# Patient Record
Sex: FEMALE | Race: WHITE | NOT HISPANIC OR LATINO | URBAN - METROPOLITAN AREA
[De-identification: names, ages, dates, MRNs, and addresses within clinical notes are randomized per-mention and may not be internally consistent; named-entity substitution may affect disease eponyms.]

---

## 2023-03-22 ENCOUNTER — EMERGENCY (EMERGENCY)
Facility: HOSPITAL | Age: 38
LOS: 1 days | Discharge: HOME | End: 2023-03-22
Admitting: EMERGENCY MEDICINE
Payer: COMMERCIAL

## 2023-03-22 VITALS
SYSTOLIC BLOOD PRESSURE: 140 MMHG | RESPIRATION RATE: 16 BRPM | TEMPERATURE: 99 F | HEART RATE: 104 BPM | DIASTOLIC BLOOD PRESSURE: 92 MMHG | OXYGEN SATURATION: 100 %

## 2023-03-22 VITALS
RESPIRATION RATE: 16 BRPM | TEMPERATURE: 98 F | DIASTOLIC BLOOD PRESSURE: 90 MMHG | HEART RATE: 100 BPM | SYSTOLIC BLOOD PRESSURE: 132 MMHG | OXYGEN SATURATION: 100 %

## 2023-03-22 LAB
ALBUMIN SERPL ELPH-MCNC: 4.6 G/DL — SIGNIFICANT CHANGE UP (ref 3.3–5)
ALP SERPL-CCNC: 45 U/L — SIGNIFICANT CHANGE UP (ref 40–120)
ALT FLD-CCNC: 19 U/L — SIGNIFICANT CHANGE UP (ref 4–33)
AMPHET UR-MCNC: POSITIVE
ANION GAP SERPL CALC-SCNC: 13 MMOL/L — SIGNIFICANT CHANGE UP (ref 7–14)
APAP SERPL-MCNC: <10 UG/ML — LOW (ref 15–25)
APPEARANCE UR: CLEAR — SIGNIFICANT CHANGE UP
AST SERPL-CCNC: 24 U/L — SIGNIFICANT CHANGE UP (ref 4–32)
BARBITURATES UR SCN-MCNC: NEGATIVE — SIGNIFICANT CHANGE UP
BASOPHILS # BLD AUTO: 0.03 K/UL — SIGNIFICANT CHANGE UP (ref 0–0.2)
BASOPHILS NFR BLD AUTO: 0.3 % — SIGNIFICANT CHANGE UP (ref 0–2)
BENZODIAZ UR-MCNC: NEGATIVE — SIGNIFICANT CHANGE UP
BILIRUB SERPL-MCNC: 0.4 MG/DL — SIGNIFICANT CHANGE UP (ref 0.2–1.2)
BILIRUB UR-MCNC: NEGATIVE — SIGNIFICANT CHANGE UP
BUN SERPL-MCNC: 6 MG/DL — LOW (ref 7–23)
CALCIUM SERPL-MCNC: 9.3 MG/DL — SIGNIFICANT CHANGE UP (ref 8.4–10.5)
CHLORIDE SERPL-SCNC: 103 MMOL/L — SIGNIFICANT CHANGE UP (ref 98–107)
CO2 SERPL-SCNC: 23 MMOL/L — SIGNIFICANT CHANGE UP (ref 22–31)
COCAINE METAB.OTHER UR-MCNC: NEGATIVE — SIGNIFICANT CHANGE UP
COLOR SPEC: SIGNIFICANT CHANGE UP
CREAT SERPL-MCNC: 0.7 MG/DL — SIGNIFICANT CHANGE UP (ref 0.5–1.3)
CREATININE URINE RESULT, DAU: 74 MG/DL — SIGNIFICANT CHANGE UP
DIFF PNL FLD: NEGATIVE — SIGNIFICANT CHANGE UP
EGFR: 113 ML/MIN/1.73M2 — SIGNIFICANT CHANGE UP
EOSINOPHIL # BLD AUTO: 0 K/UL — SIGNIFICANT CHANGE UP (ref 0–0.5)
EOSINOPHIL NFR BLD AUTO: 0 % — SIGNIFICANT CHANGE UP (ref 0–6)
ETHANOL SERPL-MCNC: <10 MG/DL — SIGNIFICANT CHANGE UP
FLUAV AG NPH QL: SIGNIFICANT CHANGE UP
FLUBV AG NPH QL: SIGNIFICANT CHANGE UP
GLUCOSE SERPL-MCNC: 98 MG/DL — SIGNIFICANT CHANGE UP (ref 70–99)
GLUCOSE UR QL: NEGATIVE — SIGNIFICANT CHANGE UP
HCT VFR BLD CALC: 37.1 % — SIGNIFICANT CHANGE UP (ref 34.5–45)
HGB BLD-MCNC: 11.8 G/DL — SIGNIFICANT CHANGE UP (ref 11.5–15.5)
IANC: 8.09 K/UL — HIGH (ref 1.8–7.4)
IMM GRANULOCYTES NFR BLD AUTO: 0.3 % — SIGNIFICANT CHANGE UP (ref 0–0.9)
KETONES UR-MCNC: ABNORMAL
LEUKOCYTE ESTERASE UR-ACNC: NEGATIVE — SIGNIFICANT CHANGE UP
LYMPHOCYTES # BLD AUTO: 2.44 K/UL — SIGNIFICANT CHANGE UP (ref 1–3.3)
LYMPHOCYTES # BLD AUTO: 22 % — SIGNIFICANT CHANGE UP (ref 13–44)
MCHC RBC-ENTMCNC: 28 PG — SIGNIFICANT CHANGE UP (ref 27–34)
MCHC RBC-ENTMCNC: 31.8 GM/DL — LOW (ref 32–36)
MCV RBC AUTO: 88.1 FL — SIGNIFICANT CHANGE UP (ref 80–100)
METHADONE UR-MCNC: NEGATIVE — SIGNIFICANT CHANGE UP
MONOCYTES # BLD AUTO: 0.51 K/UL — SIGNIFICANT CHANGE UP (ref 0–0.9)
MONOCYTES NFR BLD AUTO: 4.6 % — SIGNIFICANT CHANGE UP (ref 2–14)
NEUTROPHILS # BLD AUTO: 8.09 K/UL — HIGH (ref 1.8–7.4)
NEUTROPHILS NFR BLD AUTO: 72.8 % — SIGNIFICANT CHANGE UP (ref 43–77)
NITRITE UR-MCNC: NEGATIVE — SIGNIFICANT CHANGE UP
NRBC # BLD: 0 /100 WBCS — SIGNIFICANT CHANGE UP (ref 0–0)
NRBC # FLD: 0 K/UL — SIGNIFICANT CHANGE UP (ref 0–0)
OPIATES UR-MCNC: NEGATIVE — SIGNIFICANT CHANGE UP
OXYCODONE UR-MCNC: NEGATIVE — SIGNIFICANT CHANGE UP
PCP SPEC-MCNC: SIGNIFICANT CHANGE UP
PCP UR-MCNC: NEGATIVE — SIGNIFICANT CHANGE UP
PH UR: 6 — SIGNIFICANT CHANGE UP (ref 5–8)
PLATELET # BLD AUTO: 234 K/UL — SIGNIFICANT CHANGE UP (ref 150–400)
POTASSIUM SERPL-MCNC: 4.1 MMOL/L — SIGNIFICANT CHANGE UP (ref 3.5–5.3)
POTASSIUM SERPL-SCNC: 4.1 MMOL/L — SIGNIFICANT CHANGE UP (ref 3.5–5.3)
PROT SERPL-MCNC: 7 G/DL — SIGNIFICANT CHANGE UP (ref 6–8.3)
PROT UR-MCNC: NEGATIVE — SIGNIFICANT CHANGE UP
RBC # BLD: 4.21 M/UL — SIGNIFICANT CHANGE UP (ref 3.8–5.2)
RBC # FLD: 12.5 % — SIGNIFICANT CHANGE UP (ref 10.3–14.5)
RSV RNA NPH QL NAA+NON-PROBE: SIGNIFICANT CHANGE UP
SALICYLATES SERPL-MCNC: <0.3 MG/DL — LOW (ref 15–30)
SARS-COV-2 RNA SPEC QL NAA+PROBE: SIGNIFICANT CHANGE UP
SODIUM SERPL-SCNC: 139 MMOL/L — SIGNIFICANT CHANGE UP (ref 135–145)
SP GR SPEC: 1.01 — SIGNIFICANT CHANGE UP (ref 1.01–1.05)
THC UR QL: POSITIVE
TOXICOLOGY SCREEN, DRUGS OF ABUSE, SERUM RESULT: SIGNIFICANT CHANGE UP
TSH SERPL-MCNC: 4.13 UIU/ML — SIGNIFICANT CHANGE UP (ref 0.27–4.2)
UROBILINOGEN FLD QL: SIGNIFICANT CHANGE UP
WBC # BLD: 11.1 K/UL — HIGH (ref 3.8–10.5)
WBC # FLD AUTO: 11.1 K/UL — HIGH (ref 3.8–10.5)

## 2023-03-22 PROCEDURE — 99285 EMERGENCY DEPT VISIT HI MDM: CPT

## 2023-03-22 NOTE — ED BEHAVIORAL HEALTH ASSESSMENT NOTE - DESCRIPTION
Patient calm and cooperative. No PRNs required. none , college grad, lives with  and daughter, unemployed, though volunteers at [facility] =.

## 2023-03-22 NOTE — ED BEHAVIORAL HEALTH ASSESSMENT NOTE - RISK ASSESSMENT
Risk: past psych history, past admission, poor adherence to medication, paranoid delusions, substance use, family history and social stressors  protective factors: adequate support system, no SIIP/HIIP, able to challenge thoughts, denies paranoid delusions during interview, outpatient provider, no past SA/SIB

## 2023-03-22 NOTE — ED BEHAVIORAL HEALTH ASSESSMENT NOTE - SUMMARY
Patient initially endorsing paranoid delusions towards mother and , in the context of social stressors. Patient states challenging her thoughts, and during the interview denies any paranoid delusions. Patient denies any depressive mood or anhedonia, denies any lorie including decreased need of sleep, flight of ideas, pressured speech, increased goal directed activities. Denies any ATVOH, and denies any SIIP/HIIP. Patient has been functioning at home. Patient does not meet criteria for inpatient admission. Patient states desire of wanting to go home, reports feeling safe with  and  denies any safety concern. Patient has outpatient provider.

## 2023-03-22 NOTE — ED PROVIDER NOTE - PATIENT PORTAL LINK FT
You can access the FollowMyHealth Patient Portal offered by [facility] by registering at the following website: [email or web address]. By joining [employer]’s FollowMyHealth portal, you will also be able to view your health information using other applications (apps) compatible with our system.

## 2023-03-22 NOTE — ED PROVIDER NOTE - CLINICAL SUMMARY MEDICAL DECISION MAKING FREE TEXT BOX
This is a 37-year-old female past medical history depression with complaint of increased anxiety depression with paranoia patient was sent in by her psychiatric for possible inpatient hospitalization and medication management. Patient reports she is being very paranoid about her  and her mom.  She believes her  and her mother wants to poison and kill her. She lives with her  and 9 yr old daughter, who is currently cared by her in laws.   Psych consult requested.    ( [phone]) This is a 37-year-old female past medical history depression with complaint of increased anxiety depression with paranoia patient was sent in by her psychiatric for possible inpatient hospitalization and medication management. Patient reports she is being very paranoid about her  and her mom.  She believes her  and her mother wants to poison and kill her. She lives with her  and 9 yr old daughter, who is currently cared by her in laws.   Psych consult requested.    ( [phone])  psych recommendation - out patient follow up  There is no clinical evidence of intoxication, or any acute medical problem requiring immediate intervention. There are no signs of emergency conditions requiring admission to the hospital on today's workup. At present time patient not a harm to self or others and can be safely discharge to follow up with psychiatrist.

## 2023-03-22 NOTE — ED BEHAVIORAL HEALTH NOTE - BEHAVIORAL HEALTH NOTE
As per request of provider, writer contacted patient’s  [employer] ([phone]) to obtain collateral information. The following information is per .     Patient is a 36 Yo female domiciled with  and 8 YO daughter ( no safety concerns for the daughter ), hx of mental illness, bib by  due to paranoid delusions.  says the patient was with her mother when he got home form work. Patient had reported that she feels the mom and  was out to get her.  called psychiatrist who recommended the patient to come to the ED.  reports that the paranoid delusions started today.  says the patient seemed better when he picked her up. He reports patient was not quite herself but was able to challenge her thoughts. Pt did bring up that the sister said something horrible about her mom. Pt asked if the  made her sister attack her mom.  reports patient did not endorse any Si/Hi/AVH.  reports patient is not violent or aggressive.  reports stressors may include the anniversary  of when she got admitted 2 years ago.  says Pt and sister had falling out around this time years ago and that the patient’s Sister reached out to patient recently but it didn’t go well.  says patient is not doing anything dangerous. He says patient is sleeping, eating and showering at baseline. Patient drinks socially and uses marijuana socially as per the . He says the patient has no medical problems. He reports the patient is covid vaccinated and has no recent travel or exposure. He says the patient is not working or studying currently. Patient volunteers at [facility]. Patient has one prior admission2  years ago for paranoia where the patient called 911 because she thought the  was poisoning her. he reports Family hx of mental illness of mental illness and addiction on the patients’ mother and fathers side. Medication includes Prozac, Wellbutrin, Adzenys which  says she has been inconsistent with medication past few days. Patient’s psychiatrist is Dr. Albarran. Patient does not have access to firearms. No further safety concerns reported and  is comfortable with the patient returning home. As per request of provider, writer contacted patient’s  [employer] ([phone]) to obtain collateral information. The following information is per .     Patient is a 38 Yo female domiciled with  and 10 YO daughter ( no safety concerns for the daughter ), hx of mental illness, bib by  due to paranoid delusions.  says the patient was with her mother when he got home form work. Patient had reported that she feels the mom and  was out to get her.  called psychiatrist who recommended the patient to come to the ED.  reports that the paranoid delusions started today.  says the patient seemed better when he picked her up. He reports patient was not quite herself but was able to challenge her thoughts. Pt did bring up that the sister said something horrible about her mom. Pt asked if the  made her sister attack her mom.  reports patient did not endorse any Si/Hi/AVH.  reports patient is not violent or aggressive.  reports stressors may include the anniversary  of when she got admitted 2 years ago.  says Pt and sister had falling out around this time years ago and that the patient’s Sister reached out to patient recently but it didn’t go well.  says patient is not doing anything dangerous. He says patient is sleeping, eating and showering at baseline. Patient drinks socially and uses marijuana socially as per the . He says the patient has no medical problems. He reports the patient is covid vaccinated and has no recent travel or exposure. He says the patient is not working or studying currently. Patient volunteers at [facility]. Patient has one prior admission2  years ago for paranoia where the patient called 911 because she thought the  was poisoning her. he reports Family hx of mental illness of mental illness and addiction on the patients’ mother and fathers side. Medication includes Prozac, Wellbutrin, Adzenys which  says she has been inconsistent with medication past few days. Patient’s psychiatrist is Dr. Albarran. Patient does not have access to firearms. No further safety concerns reported and  is comfortable with the patient returning home.    Writer informed patient's  [employer] ([phone]) of discharge.  to pick patient up.

## 2023-03-22 NOTE — ED PROVIDER NOTE - OBJECTIVE STATEMENT
This is a 37-year-old female past medical history depression with complaint of increased anxiety depression with paranoia patient was sent in by her psychiatric for possible inpatient hospitalization and medication management. Patient reports she is being very paranoid about her  and her mom.  She believes her  and her mother wants to poison and kill her. She lives with her  and 9 yr old daughter, who is currently cared by her in laws.

## 2023-03-22 NOTE — ED ADULT TRIAGE NOTE - CHIEF COMPLAINT QUOTE
Pt coming in for a psychiatric evaluation, sees psychiatrist Dr. Albarran c/o marge. Pt says "I feel I can't trust anyone, feeling like I am in danger". Has been seeing this psychiatrist on and off X many years for depression. Pt is on Prozac, Wellbutrin, Adzenys. Phx: anxiety, depression, ADHD. Denies SI, HI, AH/VH, alcohol/drug use.

## 2023-03-22 NOTE — ED BEHAVIORAL HEALTH ASSESSMENT NOTE - OTHER PAST PSYCHIATRIC HISTORY (INCLUDE DETAILS REGARDING ONSET, COURSE OF ILLNESS, INPATIENT/OUTPATIENT TREATMENT)
?BAD   single inpatient hospitalization in [facility] 2 years ago for 6 days after paranoid delusions   outpatient treatment with Dr. Bell

## 2023-03-22 NOTE — ED BEHAVIORAL HEALTH ASSESSMENT NOTE - VIOLENCE PROTECTIVE FACTORS:
Residential stability/Relationship stability/Engagement in treatment/Affective Stability/Insight into violence risk and need for management/treatment

## 2023-03-22 NOTE — ED BEHAVIORAL HEALTH ASSESSMENT NOTE - PATIENT'S CHIEF COMPLAINT
"I was paranoic towards my  but I made a mistake". "I was paranoid towards my  but now I am embarrassed".

## 2023-03-22 NOTE — ED BEHAVIORAL HEALTH ASSESSMENT NOTE - HPI (INCLUDE ILLNESS QUALITY, SEVERITY, DURATION, TIMING, CONTEXT, MODIFYING FACTORS, ASSOCIATED SIGNS AND SYMPTOMS)
The patient is a 38 y/o female, , domiciles with  and 9 year old daughter, no significant PMHx, PPHx of ?BAD with one psych hospitalization 2 years ago in [facility] The patient is a 38 y/o female, , domiciles with  and 9 year old daughter, no significant PMHx, PPHx of ?BAD with one psych hospitalization (2 years ago at [facility] for paranoia), no past SA/SIB, no past violence/aggression, no legal history or arrests, substances include marijuana (3 times a week) and occasional alcohol, no history of withdrawal, BIB by  after endorsing paranoid delusions towards  and mother in te context of social stressors.     Patient reports being on the hospital after her psychiatrist recommended her to come due to paranoid delusions towards her  and mother. Per patient, states these paranoid delusions were triggered after her  had an interview for a job in [place] and received a text from him saying the interview went well and will go to the next step. Patient then reports thinking this "could be him trying to get out of the relationship" and started also to have paranoid delusions that he was going to hurt her. Patient reports talking to mother regarding this, though states she then become paranoid about her mother and believing she was going to hurt her. Of note, patient reports having a very complex relationship with mother as per patient mother is an alcoholic and can not trust her. Patient then reports calling her psychiatrist and father, and after talking to them, she states realizing her beliefs were unfounded and made mistake saying "now I am embarrassed" as per patient she has a positive relationship with . Patient also states she has been under stress as she recently started talking to her sister again and the anniversary of when she got admitted 2 years ago is coming up, which she relates as a traumatic experience. Patient denies any changes depressive mood or anhedonia, though endorses moderate anxiety. Patient denies any manic symptoms including elevated mood, pressured speech, decrease need for sleep, flight of ideas, or increased goal directed activities. Patient reports she has been sleeping and eating adequately, The patient is a 36 y/o female, , domiciles with  and 9 year old daughter, no significant PMHx, PPHx of ?BAD with one psych hospitalization (2 years ago at [facility] for paranoia), no past SA/SIB, no past violence/aggression, no legal history or arrests, substances include marijuana (3 times a week) and occasional alcohol, no history of withdrawal, BIB by  after endorsing paranoid delusions towards  and mother in te context of social stressors.     Patient reports being on the hospital after her psychiatrist recommended her to come due to paranoid delusions towards her  and mother. Per patient, states these paranoid delusions were triggered after her  had an interview for a job in [place] and received a text from him saying the interview went well and will go to the next step. Patient then reports thinking this "could be him trying to get out of the relationship" and started also to have paranoid delusions that he was going to hurt her. Patient reports talking to mother regarding this, though states she then become paranoid about her mother and believing she was going to hurt her. Of note, patient reports having a very complex relationship with mother as per patient mother is an alcoholic and can not trust her. Patient then reports calling her psychiatrist and father, and after talking to them, she states realizing her beliefs were unfounded and made mistake saying "now I am embarrassed" as per patient she has a positive relationship with . Patient also states she has been under stress as she recently started talking to her sister again and the anniversary of when she got admitted 2 years ago is coming up, which she relates as a traumatic experience. Patient denies any changes depressive mood or anhedonia, though endorses moderate anxiety. Patient denies any manic symptoms including elevated mood, pressured speech, decrease need for sleep, flight of ideas, or increased goal directed activities. Patient reports she has been sleeping and eating adequately, denies any SIIP/HIIP or AVTOH. 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 Patient denies having paranoid delusions during the interview, and states feeling safe if returning home with . Patient reports following with psychiatrist Dr. Bell since she was 16y/o, she sees him every month. Patient states being on Prozac, Wellbutrin and Adzenys for the last couple of years, thought she has been inconsistent with the Prozac for the last week, taking medication every other day. The patient is a 36 y/o female, , domiciles with  and 9 year old daughter, no significant PMHx, PPHx of ?BAD with one psych hospitalization (2 years ago at [facility] for paranoia), no past SA/SIB, no past violence/aggression, no legal history or arrests, substances include marijuana (3 times a week) and occasional alcohol, no history of withdrawal, BIB by  after endorsing paranoid delusions towards  and mother in the context of social stressors.     Patient reports being on the hospital after her psychiatrist recommended her to come due to paranoid delusions towards her  and mother. Per patient, states these paranoid delusions were triggered after her  had an interview for a job in [place] and received a text from him saying the interview went well and will go to the next step. Patient then reports thinking this "could be him trying to get out of the relationship" and started also to have paranoid delusions that he was going to hurt her. Patient reports talking to mother regarding this, though states she then become paranoid about her mother and believing she was going to hurt her. Of note, patient reports having a very complex relationship with mother as per patient mother is an alcoholic and can not trust her. Patient then reports calling her psychiatrist and father, and after talking to them, she states realizing her beliefs were unfounded and made mistake saying "now I am embarrassed" as per patient she has a positive relationship with . Patient also states she has been under stress as she recently started talking to her sister again and the anniversary of when she got admitted 2 years ago is coming up, which she relates as a traumatic experience. Patient denies any changes depressive mood or anhedonia, though endorses moderate anxiety. Patient denies any manic symptoms including elevated mood, pressured speech, decrease need for sleep, flight of ideas, or increased goal directed activities. Patient reports she has been sleeping and eating adequately, denies any SIIP/HIIP or AVTOH. 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 Patient denies having paranoid delusions during the interview, and states feeling safe if returning home with . Patient reports following with psychiatrist Dr. Bell since she was 16y/o, she sees him every month. Patient states being on Prozac, Wellbutrin and Adzenys for the last couple of years, thought she has been inconsistent with the Prozac for the last week, taking medication every other day.

## 2023-03-22 NOTE — ED BEHAVIORAL HEALTH ASSESSMENT NOTE - DETAILS
underage daughter return home to  ?hypothyroidism due to lithium No SIIP in the last month mother and father, ETOH use disorder and depression. Sister BAD and BPD able to complete safety plan

## 2023-03-22 NOTE — ED PROVIDER NOTE - NSFOLLOWUPINSTRUCTIONS_ED_ALL_ED_FT
Follow up with your primary care physician and psychiatrist in 48-72 hours.  You may also see the psychiatrist at [facility]:    [address] [place] [address]  Phone: [phone]    [facility] on [facility] [place] Information:    -Walk-in hours: Monday to Friday, 9am to 3pm   -Almost all walk-in patients will be able to see a psych prescriber the same day   -Scheduled, non-urgent, evening remote/virtual appointments are available on a limited basis. Call our  to inquire about these: [phone]. A crisis center clinician screens these requests in the late afternoon and if appropriate it takes a few days to set-up.   -Visits take about 2 to 4 hours total   -Mornings are the best time for patients to arrive    For Telehealth options try:  [employer]: [email or web address] (to access psychiatrist or therapist)  [employer]: [email or web address] (to access psychiatrist or therapist)  [facility]: [email or web address] (Largest online therapy group)      SEEK IMMEDIATE MEDICAL CARE IF YOU HAVE ANY OF THE FOLLOWING SYMPTOMS: thoughts about hurting or killing yourself, thoughts about hurting or killing somebody else, hallucinations or any other worsening or persistent symptoms OR ANY NEW OR CONCERNING SYMPTOMS.

## 2023-03-22 NOTE — ED BEHAVIORAL HEALTH ASSESSMENT NOTE - NSBHATTESTCOMMENTATTENDFT_PSY_A_CORE
The patient is a 36 y/o female, , domiciles with  and 9 year old daughter, no significant PMHx, PPHx of ?BAD with one psych hospitalization (2 years ago at [facility] for paranoia), no past SA/SIB, no past violence/aggression, no legal history or arrests, substances include marijuana (3 times a week) and occasional alcohol, no history of withdrawal, BIB by  after endorsing paranoid delusions towards  and mother in the context of social stressors.     Patient presented to the hospital after her psychiatrist recommended her to come for evaluation, she reports that she felt paranoid about her towards her  after her  had an interview for a job in [place] and received a text from him saying the interview went well and will go to the next step. Patient then reports thinking this "could be him trying to get out of the relationship" and started also to have paranoid delusions that he was going to hurt her. Patient reports talking to mother regarding this, though states she then become paranoid about her mother and believing she was going to hurt her. (she comes from dysfunctional family:  patient mother is an alcoholic and as per the pt , she can not trust her.)   Patient then reports calling her psychiatrist and father, and after talking to them, she states realizing her beliefs were unfounded and made mistake saying "now I am embarrassed" as per patient she has a positive relationship with . Patient also states she has been under stress as she recently started talking to her sister again and the anniversary of when she got admitted 2 years ago is coming up, which she relates as a traumatic experience. Patient denies any changes depressive mood or anhedonia, though endorses moderate anxiety. Patient denies any manic symptoms including elevated mood, pressured speech, decrease need for sleep, flight of ideas, or increased goal directed activities. Patient reports she has been sleeping and eating adequately, denies any SIIP/HIIP or AVTOH. 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 Patient denies having paranoid delusions during the interview, and states feeling safe if returning home with . She has been compliant with her treatment. Denies feeling hopeless or anxious, no anhedonia, good hygiene, takes care of her family and herself.  Patient wants to be d/maximo, no safety concerns at present time. She does not meet criteria for involuntary admission and will be d/maximo. D/c treatment plan discussed with pt and family.

## 2023-03-22 NOTE — ED BEHAVIORAL HEALTH ASSESSMENT NOTE - SAFETY PLAN ADDT'L DETAILS
Safety plan discussed with.../Education provided regarding environmental safety / lethal means restriction/Provision of National Suicide Prevention Lifeline 9-871-065-ULLA (1831)

## 2023-03-23 DIAGNOSIS — F43.20 ADJUSTMENT DISORDER, UNSPECIFIED: ICD-10-CM

## 2023-03-23 LAB
COVID-19 SPIKE DOMAIN AB INTERP: POSITIVE
COVID-19 SPIKE DOMAIN ANTIBODY RESULT: >250 U/ML — HIGH
SARS-COV-2 IGG+IGM SERPL QL IA: >250 U/ML — HIGH
SARS-COV-2 IGG+IGM SERPL QL IA: POSITIVE

## 2023-03-23 NOTE — BH SAFETY PLAN - STEP 3 DISTRACTION NAME
[facility] MEDICINE PROGRESS NOTE   Patient: Donna Carey  Today's Date: 4/11/2022    YOB: 1932  Admission Date: 4/5/2022    MRN: [id number]  Inpatient LOS: 6    Room: 2105/A  Hospital Day: Hospital Day: 7    Subjective   HISTORY AND SUBJECTIVE COMPLAINTS     Chief Complaint:   new onset chills, nausea, vomiting, and a loose stools    Interval History / Subjective:   89 year old female with a history of COPD, DMT2, CKD presents to the hospital from [facility] with new onset chills, nausea, vomiting, and a loose stools that started today after lunch. She also noted chills last night and slight cough but has otherwise been in her usual state of health. She has been eating well and denies any coughing or choking with po intake. No recent medication changes. She uses oxygen 2L as needed but not on a regular basis. When she discharged home from the hospital in 12/21 (for RLL pneumonia) she was on 4L oxygen with activity, none at rest. In the ED, patient is found to have a RLL infiltrate and small effusion on CT scan, hypoxia, and abnormal UA, elevated procalcitonin and lactic acid, and hypoxia.  Treated with IV antibiotics and O2.  Had increased work of breathing, significant dyspnea requiring up to 8 L nasal cannula and therefore transferred to ICU on 04/06.  Placed on noninvasive ventilation.  Id consulted and following.  Positive Escherichia coli bacteremia.  Also placed on IV Lasix for acute on chronic CHF with preserved EF.  Eventually improved and oxygen weaned down to 4 L and transferred back to the floor on 04/10.  4/11: Sitting in chair with oxygen at 2 liters/minute.  Eating, drinking.  Feeling better.    Hospital Course:  Donna Carey is a 89 year old female who presented on 4/5/2022 with complaints of Vomiting and Diarrhea Adult  .    ROS:  Pertinent systems negative except as above.    Objective   PHYSICAL EXAMINATION     Vital 24 Hour Range Most Recent Value    Temperature Temp  Min: 97.6 °F (36.4 °C)  Max: 98.8 °F (37.1 °C) 98.8 °F (37.1 °C)   Pulse Pulse  Min: 80  Max: 99 80   Respiratory Resp  Min: 16  Max: 18 16   Blood Pressure BP  Min: 133/64  Max: 137/65 137/65   Pulse Oximetry SpO2  Min: 92 %  Max: 95 % 94 %   Arterial BP No data recorded     O2 O2 Flow Rate (L/min)  Av L/min  Min: 2 L/min   Min taken time: 22  Max: 2 L/min   Max taken time: 22       Recorded Intake and Output:    Intake/Output Summary (Last 24 hours) at 2022 1222  Last data filed at 4/10/2022 2339  Gross per 24 hour   Intake --   Output 200 ml   Net -200 ml      Recorded Last Stool Occurrence: 1 (22 0836)     Vital Most Recent Value First Value   Weight 53.4 kg (117 lb 11.6 oz) Weight: 54.9 kg (121 lb 0.5 oz)   Height 4' 10\" (147.3 cm) Height: 4' 10\" (147.3 cm)   BMI 24.6 N/A     General: Looks acutely ill well developed, well nourished  CV: regular rate and rhythm and + murmur noted 2/6  Resp: clear to auscultation bilaterally  Abd: soft, nontender, nondistended and bowel sounds  present  Ext: 5/5 muscle strength in upper and lower extremities bilaterally and edema absent   Skin: no rashes, lesions, or ulcers noted  Neuro: CN 2-12 grossly intact, no focal deficits noted and Generalized weakness  Psych: normal judgement and insight, oriented to time, place and person and normal mood and affect    TEST RESULTS     Labs: The Laboratory values listed below have been reviewed and pertinent findings discussed in the Assessment and Plan.    Laboratory values:   Recent Labs   Lab 22  0751 04/10/22  04122  0341   WBC 5.1 5.7 7.0   HGB 9.9* 10.2* 9.8*   HCT 30.7* 31.6* 30.8*    200 184       Recent Labs   Lab 22  0751 04/10/22  0419 22  0341   SODIUM 135 135 140   POTASSIUM 4.3 4.0 3.9   CHLORIDE 101 99 104   CO2 29 31 30   CALCIUM 9.1 8.8 8.3*   GLUCOSE 165* 192* 157*   BUN 33* 28* 28*   CREATININE 1.36* 1.29* 1.31*        Recent Labs    Lab 04/05/22  1357   ALBUMIN 2.7*   AST 76*   GPT 53   BILIRUBIN 0.6     Recent Labs   Lab 04/09/22  0341 04/08/22  0625 04/07/22  0743 04/06/22  0406   PCT 5.28* 7.90* 14.60* 18.17*   LACTA  --   --   --  1.4     Recent Labs   Lab 04/10/22  1721 04/10/22  2049 04/11/22  0743 04/11/22  1147   GLUCOSE BEDSIDE 228* 280* 144* 297*         Recent Labs   Lab 04/10/22  0419 04/09/22  0341 04/08/22  0625   NTPROB 6,515* 11,458* 17,981*       Lab Results   Component Value Date    VB12 169 (L) 09/14/2020    JAMES 18.4 09/14/2020    VITD25 19.3 (L) 11/14/2011    TSH 1.980 02/09/2021    HGBA1C 6.4 (H) 11/22/2021        Lab Results   Component Value Date    CHOLESTEROL 109 11/23/2021    HDL 29 (L) 11/23/2021    CALCLDL 58 11/23/2021        Lab Results   Component Value Date    USPG 1.010 04/05/2022    UPROT Negative 04/05/2022    UWBC Moderate (A) 04/05/2022    URBC Small (A) 04/05/2022    UPH 5.0 04/05/2022    UBACTRA Few (A) 04/05/2022       Recent Labs   Lab 04/05/22  1357   PT 11.7   INR 1.1   PTT 25         Radiology: Imaging studies have been reviewed and pertinent findings discussed in the Assessment and Plan.  No results found for any visits on 04/05/22 (from the past 48 hour(s)).     ANCILLARY ORDERS     Diet:  Dysphagia/[facility] Soft Diet  Nursing To Pass Tray - Periodic Supervision  One Time Diet Dysphagia 3/easy Chew; Speech Therapy Tray- Leave At Desk. Please Send Grilled Cheese Revillo With Tomato Soup, Orange Sherbet And Black Tea Asap. Thank You  Telemetry: Off  Consults:    IP CONSULT TO SOCIAL WORK  IP CONSULT TO INFECTIOUS DISEASES  Therapy Orders:   PT and OT Orders Placed this Encounter   Procedures   • Occupational Therapy   • Occupational Therapy   • Occupational Therapy   • Physical Therapy   • Physical Therapy   • Physical Therapy       ADVANCED DIRECTIVES     Code Status: Do Not Resuscitate         ASSESSMENT AND PLAN     1. Acute on chronic hypoxic respiratory failure  2. Right lower lobe pneumonia with  pleural effusion, pulmonary edema  3. Severe sepsis-resolved  4. Escherichia coli bacteremia  5. Chronic kidney disease stage 3   6. COPD, currently stable  7. Moderate aortic valve stenosis  8. Acute on chronic CHF with preserved EF  9. Type 2 diabetes       4/11:  Patient feeling better.  Oxygen weaned to 2 liters/minute at rest.  IV Lasix changed to 40 mg p.o. daily.  Decreasing DuoNebs to t.i.d. as patient does not wish to get late at night.  Sitting in chair.  Daughter Laura present.  Physical therapy has seen and recommending [facility].  Afebrile.  No coughing during entire exam.  Continuing on ceftriaxone IV as per ID.  Creatinine stable.  Blood sugars elevated in the mid to upper 200s.  Will need to adjust sliding scale.      Smoking status: non smoker    Nutrition status: appropriate  Body mass index is 24.6 kg/m². - appropriate weight BMI 18.5-24  DVT Prophylaxis: Lovenox prophylactic dosing (dose adjusted as per renal function)         DISCHARGE PLANNING     The patient's treatment plans were discussed with patient.     Recommendations for Discharge   SW Assisted living, Sub-acute nursing home   PT  ([facility])   OT Sub-acute nursing home   SLP TBD pending ongoing/further evaluation      Anticipated discharge destination: Skilled Nursing Facility SNF  Expected Discharge Date:  1-3 days  Barriers to Discharge: Oxygen, placement        Venu Osman MD  Hospitalist  4/11/2022  12:22 PM     .

## 2023-04-02 ENCOUNTER — INPATIENT (INPATIENT)
Facility: HOSPITAL | Age: 38
LOS: 3 days | Discharge: HOME | End: 2023-04-06
Attending: PSYCHIATRY & NEUROLOGY | Admitting: PSYCHIATRY & NEUROLOGY
Payer: COMMERCIAL

## 2023-04-02 VITALS
SYSTOLIC BLOOD PRESSURE: 158 MMHG | OXYGEN SATURATION: 100 % | TEMPERATURE: 100 F | HEART RATE: 130 BPM | RESPIRATION RATE: 20 BRPM | DIASTOLIC BLOOD PRESSURE: 76 MMHG

## 2023-04-02 DIAGNOSIS — F39 UNSPECIFIED MOOD [AFFECTIVE] DISORDER: ICD-10-CM

## 2023-04-02 DIAGNOSIS — F22 DELUSIONAL DISORDERS: ICD-10-CM

## 2023-04-02 PROBLEM — F31.9 BIPOLAR DISORDER, UNSPECIFIED: Chronic | Status: ACTIVE | Noted: 2023-04-01

## 2023-04-02 LAB
ALBUMIN SERPL ELPH-MCNC: 4.5 G/DL — SIGNIFICANT CHANGE UP (ref 3.3–5)
ALP SERPL-CCNC: 41 U/L — SIGNIFICANT CHANGE UP (ref 40–120)
ALT FLD-CCNC: 15 U/L — SIGNIFICANT CHANGE UP (ref 4–33)
ANION GAP SERPL CALC-SCNC: 13 MMOL/L — SIGNIFICANT CHANGE UP (ref 7–14)
APPEARANCE UR: CLEAR — SIGNIFICANT CHANGE UP
AST SERPL-CCNC: 21 U/L — SIGNIFICANT CHANGE UP (ref 4–32)
BASOPHILS # BLD AUTO: 0.03 K/UL — SIGNIFICANT CHANGE UP (ref 0–0.2)
BASOPHILS NFR BLD AUTO: 0.4 % — SIGNIFICANT CHANGE UP (ref 0–2)
BILIRUB SERPL-MCNC: <0.2 MG/DL — SIGNIFICANT CHANGE UP (ref 0.2–1.2)
BILIRUB UR-MCNC: NEGATIVE — SIGNIFICANT CHANGE UP
BUN SERPL-MCNC: 17 MG/DL — SIGNIFICANT CHANGE UP (ref 7–23)
CALCIUM SERPL-MCNC: 9.1 MG/DL — SIGNIFICANT CHANGE UP (ref 8.4–10.5)
CHLORIDE SERPL-SCNC: 103 MMOL/L — SIGNIFICANT CHANGE UP (ref 98–107)
CO2 SERPL-SCNC: 19 MMOL/L — LOW (ref 22–31)
COLOR SPEC: SIGNIFICANT CHANGE UP
COVID-19 SPIKE DOMAIN AB INTERP: POSITIVE
COVID-19 SPIKE DOMAIN ANTIBODY RESULT: >250 U/ML — HIGH
CREAT SERPL-MCNC: 0.74 MG/DL — SIGNIFICANT CHANGE UP (ref 0.5–1.3)
DIFF PNL FLD: NEGATIVE — SIGNIFICANT CHANGE UP
EGFR: 106 ML/MIN/1.73M2 — SIGNIFICANT CHANGE UP
EOSINOPHIL # BLD AUTO: 0.02 K/UL — SIGNIFICANT CHANGE UP (ref 0–0.5)
EOSINOPHIL NFR BLD AUTO: 0.3 % — SIGNIFICANT CHANGE UP (ref 0–6)
FLUAV AG NPH QL: SIGNIFICANT CHANGE UP
FLUBV AG NPH QL: SIGNIFICANT CHANGE UP
GLUCOSE SERPL-MCNC: 109 MG/DL — HIGH (ref 70–99)
GLUCOSE UR QL: NEGATIVE — SIGNIFICANT CHANGE UP
HCG SERPL-ACNC: <5 MIU/ML — SIGNIFICANT CHANGE UP
HCT VFR BLD CALC: 35.7 % — SIGNIFICANT CHANGE UP (ref 34.5–45)
HGB BLD-MCNC: 11.7 G/DL — SIGNIFICANT CHANGE UP (ref 11.5–15.5)
IANC: 5.93 K/UL — SIGNIFICANT CHANGE UP (ref 1.8–7.4)
IMM GRANULOCYTES NFR BLD AUTO: 0.1 % — SIGNIFICANT CHANGE UP (ref 0–0.9)
KETONES UR-MCNC: NEGATIVE — SIGNIFICANT CHANGE UP
LEUKOCYTE ESTERASE UR-ACNC: ABNORMAL
LYMPHOCYTES # BLD AUTO: 1.47 K/UL — SIGNIFICANT CHANGE UP (ref 1–3.3)
LYMPHOCYTES # BLD AUTO: 18.4 % — SIGNIFICANT CHANGE UP (ref 13–44)
MCHC RBC-ENTMCNC: 28.7 PG — SIGNIFICANT CHANGE UP (ref 27–34)
MCHC RBC-ENTMCNC: 32.8 GM/DL — SIGNIFICANT CHANGE UP (ref 32–36)
MCV RBC AUTO: 87.7 FL — SIGNIFICANT CHANGE UP (ref 80–100)
MONOCYTES # BLD AUTO: 0.52 K/UL — SIGNIFICANT CHANGE UP (ref 0–0.9)
MONOCYTES NFR BLD AUTO: 6.5 % — SIGNIFICANT CHANGE UP (ref 2–14)
NEUTROPHILS # BLD AUTO: 5.93 K/UL — SIGNIFICANT CHANGE UP (ref 1.8–7.4)
NEUTROPHILS NFR BLD AUTO: 74.3 % — SIGNIFICANT CHANGE UP (ref 43–77)
NITRITE UR-MCNC: NEGATIVE — SIGNIFICANT CHANGE UP
NRBC # BLD: 0 /100 WBCS — SIGNIFICANT CHANGE UP (ref 0–0)
NRBC # FLD: 0 K/UL — SIGNIFICANT CHANGE UP (ref 0–0)
PCP SPEC-MCNC: SIGNIFICANT CHANGE UP
PH UR: 6 — SIGNIFICANT CHANGE UP (ref 5–8)
PLATELET # BLD AUTO: 185 K/UL — SIGNIFICANT CHANGE UP (ref 150–400)
POTASSIUM SERPL-MCNC: 3.8 MMOL/L — SIGNIFICANT CHANGE UP (ref 3.5–5.3)
POTASSIUM SERPL-SCNC: 3.8 MMOL/L — SIGNIFICANT CHANGE UP (ref 3.5–5.3)
PROT SERPL-MCNC: 6.8 G/DL — SIGNIFICANT CHANGE UP (ref 6–8.3)
PROT UR-MCNC: ABNORMAL
RBC # BLD: 4.07 M/UL — SIGNIFICANT CHANGE UP (ref 3.8–5.2)
RBC # FLD: 12.3 % — SIGNIFICANT CHANGE UP (ref 10.3–14.5)
RSV RNA NPH QL NAA+NON-PROBE: SIGNIFICANT CHANGE UP
SARS-COV-2 IGG+IGM SERPL QL IA: >250 U/ML — HIGH
SARS-COV-2 IGG+IGM SERPL QL IA: POSITIVE
SARS-COV-2 RNA SPEC QL NAA+PROBE: SIGNIFICANT CHANGE UP
SODIUM SERPL-SCNC: 135 MMOL/L — SIGNIFICANT CHANGE UP (ref 135–145)
SP GR SPEC: 1.02 — SIGNIFICANT CHANGE UP (ref 1.01–1.05)
TOXICOLOGY SCREEN, DRUGS OF ABUSE, SERUM RESULT: SIGNIFICANT CHANGE UP
TSH SERPL-MCNC: 3.23 UIU/ML — SIGNIFICANT CHANGE UP (ref 0.27–4.2)
UROBILINOGEN FLD QL: SIGNIFICANT CHANGE UP
WBC # BLD: 7.98 K/UL — SIGNIFICANT CHANGE UP (ref 3.8–10.5)
WBC # FLD AUTO: 7.98 K/UL — SIGNIFICANT CHANGE UP (ref 3.8–10.5)

## 2023-04-02 PROCEDURE — 99053 MED SERV 10PM-8AM 24 HR FAC: CPT

## 2023-04-02 PROCEDURE — 99285 EMERGENCY DEPT VISIT HI MDM: CPT

## 2023-04-02 PROCEDURE — 93010 ELECTROCARDIOGRAM REPORT: CPT

## 2023-04-02 RX ORDER — DIPHENHYDRAMINE HCL 50 MG
50 CAPSULE ORAL EVERY 8 HOURS
Refills: 0 | Status: DISCONTINUED | OUTPATIENT
Start: 2023-04-02 | End: 2023-04-06

## 2023-04-02 RX ORDER — ARIPIPRAZOLE 15 MG/1
5 TABLET ORAL DAILY
Refills: 0 | Status: DISCONTINUED | OUTPATIENT
Start: 2023-04-02 | End: 2023-04-03

## 2023-04-02 RX ORDER — HALOPERIDOL DECANOATE 100 MG/ML
5 INJECTION INTRAMUSCULAR EVERY 8 HOURS
Refills: 0 | Status: DISCONTINUED | OUTPATIENT
Start: 2023-04-02 | End: 2023-04-06

## 2023-04-02 RX ORDER — TRAZODONE HCL 50 MG
50 TABLET ORAL ONCE
Refills: 0 | Status: DISCONTINUED | OUTPATIENT
Start: 2023-04-02 | End: 2023-04-06

## 2023-04-02 RX ADMIN — ARIPIPRAZOLE 5 MILLIGRAM(S): 15 TABLET ORAL at 22:47

## 2023-04-02 NOTE — ED BEHAVIORAL HEALTH ASSESSMENT NOTE - RISK ASSESSMENT
Risk: past psych history, past admission, poor adherence to medication, paranoid delusions, substance use, family history and social stressors, acute psychosis   protective factors: adequate support system, no SIIP/HIIP, outpatient provider, no past SA/SIB

## 2023-04-02 NOTE — ED BEHAVIORAL HEALTH NOTE - BEHAVIORAL HEALTH NOTE
COVID Exposure Screen- Patient  1.	*Have you had a COVID-19 test in the last 90 days?  (  ) Yes   ( x   ) No   (  ) Unknown- Reason: _____  IF YES PROCEED TO QUESTION #2. IF NO OR UNKNOWN, PLEASE SKIP TO QUESTION #3.  2.	Date of test(s) and result(s): ________  3.	*Have you tested positive for COVID-19 antibodies? (  ) Yes   (  ) No   (  ) Unknown- Reason: _____  IF YES PROCEED TO QUESTION #4. IF NO or UNKNOWN, PLEASE SKIP TO QUESTION #5.  4.	Date of positive antibody test: ________  5.	*Have you received 2 doses of the COVID-19 vaccine? (  ) Yes   (  ) No   (  ) Unknown- Reason: _____   IF YES PROCEED TO QUESTION #6. IF NO or UNKNOWN, PLEASE SKIP TO QUESTION #7.  6.	Date of second dose: _____DOES NOT REMEMBER ___  7.	*In the past 10 days, have you been around anyone with a positive COVID-19 test?* (  ) Yes   (  ) No   (  ) Unknown- Reason: ____  IF YES PROCEED TO QUESTION #8. IF NO or UNKNOWN, PLEASE SKIP TO QUESTION #13.  8.	Were you within 6 feet of them for at least 15 minutes? (  ) Yes   (  ) No   (  ) Unknown- Reason: _____  9.	Have you provided care for them? (  ) Yes   (  ) No   (  ) Unknown- Reason: ______  10.	Have you had direct physical contact with them (touched, hugged, or kissed them)? (  ) Yes   (  ) No    (  ) Unknown- Reason: _____  11.	Have you shared eating or drinking utensils with them? (  ) Yes   (  ) No    (  ) Unknown- Reason: ____  12.	Have they sneezed, coughed, or somehow gotten respiratory droplets on you? (  ) Yes   (  ) No    (  ) Unknown- Reason: ______  13.	*Have you been out of [place] within the past 10 days?* (  ) Yes   ( x  ) No   (  ) Unknown- Reason: _____  IF YES PLEASE ANSWER THE FOLLOWING QUESTIONS:  14.	Which state/country have you been to? ______  15.	Were you there over 24 hours? (  ) Yes   (  ) No    (  ) Unknown- Reason: ______  16.	Date of return to [facility]: ______

## 2023-04-02 NOTE — ED BEHAVIORAL HEALTH ASSESSMENT NOTE - OTHER PAST PSYCHIATRIC HISTORY (INCLUDE DETAILS REGARDING ONSET, COURSE OF ILLNESS, INPATIENT/OUTPATIENT TREATMENT)
one inpatient hospitalization in [facility] 2 years ago for 6 days after paranoid delusions   outpatient treatment with Dr. Bell

## 2023-04-02 NOTE — ED BEHAVIORAL HEALTH ASSESSMENT NOTE - DESCRIPTION
Patient cooperative. No PRNs required. , college grad, lives with  and daughter, unemployed, though volunteers at [facility]. none

## 2023-04-02 NOTE — ED PROVIDER NOTE - PHYSICAL EXAMINATION
Anxious appearing female jittery appearing does not appear to be preoccupied or internally reacting.  Eyes are normal heart is tachycardic lungs are clear abdomen soft nontender gait is normal.  No SI/HI

## 2023-04-02 NOTE — ED BEHAVIORAL HEALTH ASSESSMENT NOTE - DETAILS
mother and father, ETOH use disorder and depression. Sister BAD and BPD underage daughter SOFÍA ?hypothyroidism due to lithium No SIIP in the last month
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
## 2023-04-02 NOTE — ED BEHAVIORAL HEALTH ASSESSMENT NOTE - SUMMARY
Patient is psychotic, paranoid about her ; episodes of mood swings, agitation,  irritability, episodes of crying. She is not functioning and asking for admission, Recommended to be seen in ER by her current psychiatrist. patient is unable to function and need psych admission for stabilization and safety

## 2023-04-02 NOTE — ED BEHAVIORAL HEALTH ASSESSMENT NOTE - NSSUICPROTFACT_PSY_ALL_CORE
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
## 2023-04-02 NOTE — ED ADULT TRIAGE NOTE - CHIEF COMPLAINT QUOTE
psych eval    pt states feels unsafe with ... wants to be somewhere safe to try to figure out what is real and not real. states she can't trust.. she doesn't know what to do.  just wanted to be left alone. when  was yelling at her, told him she would kill herself with a knife.  pmhx- bipolar.  admits to having 1/2 marijuana gummy and a couple of margaritas much earlier in the day. denies any suicidal plan, no homicidal ideation, no auditory visual hallucinations.  - bob [phone].  as per pt;s request, pt asked to leave.

## 2023-04-02 NOTE — ED ADULT TRIAGE NOTE - BP NONINVASIVE SYSTOLIC (MM HG)
PRE-ADMIT TESTING -  [phone]    [address]  [place] [facility]          Your surgery has been scheduled at [facility]. We are pleased to have the opportunity to serve you. For Further Information please call [phone].    On the day of surgery please report to the Information Desk on the 1st floor.    · CONTACT YOUR PHYSICIAN'S OFFICE THE DAY PRIOR TO YOUR SURGERY TO OBTAIN YOUR ARRIVAL TIME.     · The evening before surgery do not eat anything after 9 p.m. ( this includes hard candy, chewing gum and mints).  You may only have GATORADE, POWERADE AND WATER  from 9 p.m. until you leave your home.   DO NOT DRINK ANY LIQUIDS ON THE WAY TO THE HOSPITAL.      SPECIAL MEDICATION INSTRUCTIONS: TAKE medications checked off by the Anesthesiologist on your Medication List.    Angiogram Patients: Take medications as instructed by your physician, including aspirin.     Surgery Patients:    If you take ASPIRIN - Your PHYSICIAN/SURGEON will need to inform you IF/OR when you need to stop taking aspirin prior to your surgery.     Do Not take any medications containing IBUPROFEN.  Do Not Wear any make-up or dark nail polish   (especially eye make-up) to surgery. If you come to surgery with makeup on you will be required to remove the makeup or nail polish.    Do not shave your surgical area at least 5 days prior to your surgery. The surgical prep will be performed at the hospital according to Infection Control regulations.    Leave all valuables at home.   Do Not wear any jewelry or watches, including any metal in body piercings. Jewelry must be removed prior to coming to the hospital.  There is a possibility that rings that are unable to be removed may be cut off if they are on the surgical extremity.    Contact Lens must be removed before surgery. Either do not wear the contact lens or bring a case and solution for storage.  Please bring a container for eyeglasses or dentures as required.  Bring  any paperwork your physician has provided, such as consent forms,  history and physicals, doctor's orders, etc.   Bring comfortable clothes that are loose fitting to wear upon discharge. Take into consideration the type of surgery being performed.  Maintain your diet as advised per your physician the day prior to surgery.      Adequate rest the night before surgery is advised.   Park in the Parking lot behind the hospital or in the [facility] across the street from the parking lot. Parking is complimentary.  If you will be discharged the same day as your procedure, please arrange for a responsible adult to drive you home or to accompany you if traveling by taxi.   YOU WILL NOT BE PERMITTED TO DRIVE OR TO LEAVE THE HOSPITAL ALONE AFTER SURGERY.   It is strongly recommended that you arrange for someone to remain with you for the first 24 hrs following your surgery.       Thank you for your cooperation.  The Staff of [facility].                Bathing Instructions with Hibiclens     Shower the evening before and morning of your procedure with Hibiclens:   Wash your face with water and your regular face wash/soap   Apply Hibiclens directly on your skin or on a wet washcloth and wash gently. When showering: Move away from the shower stream when applying Hibiclens to avoid rinsing off too soon.   Rinse thoroughly with warm water   Do not dilute Hibiclens         Dry off as usual, do not use any deodorant, powder, body lotions, perfume, after shave or cologne.                   158
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
## 2023-04-02 NOTE — ED PROVIDER NOTE - PROGRESS NOTE DETAILS
SHARMA: pt labs unremarkable. IUTox shows THC and alcohol level 20. Pt still waiting for psych consult/recs. Will continue to monitor pending recs. EDITH: Patient labs are unremarkable.  Still pending psych to see.  We will continue to monitor pending psych recs.  Patient is medically cleared. Jose JONES: Received sign out from my colleague Dr. Jones pt presents w c/f psychosis, found to have UTI pending admission to [facility] at time of sign out, labs reviewed non actionable, medically cleared for admission to [facility].

## 2023-04-02 NOTE — ED ADULT NURSE NOTE - CHIEF COMPLAINT QUOTE
psych eval    pt states feels unsafe with ... wants to be somewhere safe to try to figure out what is real and not real. states she can't trust.. she doesn't know what to do.  just wanted to be left alone. when  was yelling at her, told him she would kill herself with a knife.  pmhx- bipolar.  admits to having 1/2 marijuana gummy and a couple of margaritas much earlier in the day. denies any suicidal plan, no homicidal ideation, no auditory visual hallucinations.  - bob [phone].  as per pt;s request, pt asked to leave.
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
## 2023-04-02 NOTE — BH PATIENT PROFILE - FALL HARM RISK - UNIVERSAL INTERVENTIONS
Bed in lowest position, wheels locked, appropriate side rails in place/Call bell, personal items and telephone in reach/Instruct patient to call for assistance before getting out of bed or chair/Non-slip footwear when patient is out of bed/[place] to call system/Physically safe environment - no spills, clutter or unnecessary equipment/Purposeful Proactive Rounding/Room/bathroom lighting operational, light cord in reach

## 2023-04-03 PROCEDURE — 99223 1ST HOSP IP/OBS HIGH 75: CPT | Mod: 25

## 2023-04-03 PROCEDURE — 90853 GROUP PSYCHOTHERAPY: CPT

## 2023-04-03 RX ORDER — IBUPROFEN 200 MG
600 TABLET ORAL EVERY 6 HOURS
Refills: 0 | Status: DISCONTINUED | OUTPATIENT
Start: 2023-04-03 | End: 2023-04-06

## 2023-04-03 RX ORDER — ARIPIPRAZOLE 15 MG/1
10 TABLET ORAL DAILY
Refills: 0 | Status: DISCONTINUED | OUTPATIENT
Start: 2023-04-03 | End: 2023-04-06

## 2023-04-03 RX ADMIN — Medication 600 MILLIGRAM(S): at 21:41

## 2023-04-03 RX ADMIN — Medication 600 MILLIGRAM(S): at 22:41

## 2023-04-03 RX ADMIN — Medication 600 MILLIGRAM(S): at 15:30

## 2023-04-03 RX ADMIN — ARIPIPRAZOLE 5 MILLIGRAM(S): 15 TABLET ORAL at 08:15

## 2023-04-03 RX ADMIN — Medication 600 MILLIGRAM(S): at 14:40

## 2023-04-03 NOTE — BH INPATIENT PSYCHIATRY ASSESSMENT NOTE - OTHER PAST PSYCHIATRIC HISTORY (INCLUDE DETAILS REGARDING ONSET, COURSE OF ILLNESS, INPATIENT/OUTPATIENT TREATMENT)
one inpatient hospitalization in [facility] 2 years ago for 6 days after paranoid delusions   outpatient treatment with Dr. Bell; ED treat and release a about two weeks ago similar presentation

## 2023-04-03 NOTE — BH INPATIENT PSYCHIATRY ASSESSMENT NOTE - DETAILS
?hypothyroidism due to lithium No SIIP in the last month mother and father, ETOH use disorder and depression. Sister BAD and BPD

## 2023-04-03 NOTE — PSYCHIATRIC REHAB INITIAL EVALUATION - NSBHALCSUBUSE_PSY_ALL_CORE
pt reports engaging in alcohol use intermittently and reports "I am not an alcoholic." The pt reports utilizing marijuana 3 times per week and endorses wanting to stop utilizing substances all together as they are triggers to exacerbating symptoms of paranoia

## 2023-04-03 NOTE — BH INPATIENT PSYCHIATRY ASSESSMENT NOTE - OTHER
"okay" superficial very talkative but not pressured currently denies si/hi intents or plans, minimizes previous possibly delusional content
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
## 2023-04-03 NOTE — PSYCHIATRIC REHAB INITIAL EVALUATION - NSBHPRRECOMMEND_PSY_ALL_CORE
Writer met with patient to orient to unit and introduce self, psychiatric rehabilitation staff and department functions. On approach, the pt was eating lunch in her personal room and agreed to meet with writer. Pt appeared with a bright affect and positive outlook on her current situation. Patient demonstrated insight towards self and family life at home along with stressors of home environment. Patient was briefed on the group schedule and psychiatric rehabilitation department unit functions. Writer encouraged patient to attend psychiatric rehabilitation groups and engage in treatment. Pt is a 37-year-old female, domiciled at home with her  and 9-year-old daughter. The pt presents to [place] [id number] with a primary concern of utilizing substances (alcohol and marijuana) and arguing with her  at home, escalating to her becoming paranoid. Pt has one previous psychiatric admission (2 years ago) in similar context. No previous SA/SIB, and no violence or legal issues. Substances utilized by patient include marijuana 3 times per week and occasional alcohol. Pt states during interview, “I am not an alcoholic.” Pt BIB her  after endorsing paranoid delusions towards her  after he spoke to the patient’s psychiatrist. Pt denied feeling paranoid on approach today and denied symptoms of anxiety. Pt denied SI/HI/I/P and AH/VH/TH. Pt reported that she knows that drinking alcohol and utilizing marijuana are warning signs for exacerbating symptoms and that in order to remain in recovery, she understands she needs to go back to her original coping skills and not fall on substances as unhealthy means of coping. Pt reported that this is like a “cycle” that every time she drinks more or in this case, cross utilizes marijuana and alcohol, she feels paranoid and anxious. Writer and patient were able to establish a collaborative psychiatric rehabilitation goal.  Psychiatric Rehabilitation staff will continue to engage patient daily in order to develop therapeutic rapport. Will continue to support the pt towards goal progress during the current stay.

## 2023-04-03 NOTE — BH INPATIENT PSYCHIATRY ASSESSMENT NOTE - CURRENT MEDICATION
MEDICATIONS  (STANDING):  ARIPiprazole 10 milliGRAM(s) Oral daily    MEDICATIONS  (PRN):  diphenhydrAMINE 50 milliGRAM(s) Oral every 8 hours PRN Rash and/or Itching  haloperidol     Tablet 5 milliGRAM(s) Oral every 8 hours PRN psychosis  haloperidol    Injectable 5 milliGRAM(s) IntraMuscular every 8 hours PRN Agitation  ibuprofen  Tablet. 600 milliGRAM(s) Oral every 6 hours PRN Mild Pain (1 - 3), Moderate Pain (4 - 6)  LORazepam     Tablet 2 milliGRAM(s) Oral every 8 hours PRN Agitation  LORazepam   Injectable 2 milliGRAM(s) IntraMuscular every 8 hours PRN Agitation  traZODone 50 milliGRAM(s) Oral Once PRN insomnia

## 2023-04-03 NOTE — BH INPATIENT PSYCHIATRY ASSESSMENT NOTE - NSBHCHARTREVIEWVS_PSY_A_CORE FT
Vital Signs Last 24 Hrs  T(C): 36.8 (04-03-23 @ 07:57), Max: 36.8 (04-03-23 @ 07:57)  T(F): 98.2 (04-03-23 @ 07:57), Max: 98.2 (04-03-23 @ 07:57)  HR: --  BP: --  BP(mean): --  RR: --  SpO2: --    Orthostatic VS  04-03-23 @ 07:57  Lying BP: --/-- HR: --  Sitting BP: 131/74 HR: 75  Standing BP: --/-- HR: --  Site: --  Mode: --  Orthostatic VS  04-02-23 @ 11:30  Lying BP: --/-- HR: --  Sitting BP: 132/69 HR: 92  Standing BP: 117/53 HR: 94  Site: --  Mode: --

## 2023-04-03 NOTE — BH INPATIENT PSYCHIATRY ASSESSMENT NOTE - NSBHASSESSSUMMFT_PSY_ALL_CORE
The patient is a 36 y/o female, , domiciles with  and 9 year old daughter, no significant PMHx, PPHx of ?BAD with one psych hospitalization (2 years ago at [facility] for paranoia), recent ED treat and release for similar symptoms; no past SA, no past violence/aggression, no legal history or arrests, history of traums, history of NSSIB, recent cannabis use and increased alcohol use.  BIB by  after episode of suicidal ideation and endorsing paranoid delusions towards  after he spoke with patient's psychiatrist.  She is rapidly improving, or at least appears to be.  Differential includes mood episode, substance induced mood episode, borderline personality.    1. Continue inpatient hospitalization for safety and stabilization.  Dangerous to self.  Seems to be doing beter, denies si/hi intents or plans.  Minimizing.  Routine checks appropriate.  2. haldol ativan and benadyrl prns  3. will increas abilify to 10mg daily.  4. will repeat ekg in am (prolonged QTC--likely secondary to acute etoh)

## 2023-04-03 NOTE — BH INPATIENT PSYCHIATRY ASSESSMENT NOTE - NSTXSUBMISGOALOTHER_PSY_ALL_CORE
Pt will identify 2 healthy coping skills that she could utilize in place of utilizing substances by day 7.

## 2023-04-04 PROCEDURE — 99221 1ST HOSP IP/OBS SF/LOW 40: CPT

## 2023-04-04 PROCEDURE — 90853 GROUP PSYCHOTHERAPY: CPT

## 2023-04-04 RX ADMIN — Medication 600 MILLIGRAM(S): at 20:23

## 2023-04-04 RX ADMIN — ARIPIPRAZOLE 10 MILLIGRAM(S): 15 TABLET ORAL at 08:06

## 2023-04-04 RX ADMIN — Medication 600 MILLIGRAM(S): at 09:00

## 2023-04-04 RX ADMIN — Medication 600 MILLIGRAM(S): at 19:23

## 2023-04-04 RX ADMIN — Medication 600 MILLIGRAM(S): at 10:34

## 2023-04-04 NOTE — BH INPATIENT PSYCHIATRY PROGRESS NOTE - OTHER
superficial "okay" currently denies si/hi intents or plans, minimizes previous possibly delusional content

## 2023-04-04 NOTE — BH PSYCHOLOGY - GROUP THERAPY NOTE - TOKEN PULL-DIAGNOSIS
Primary Diagnosis:  MDD (major depressive disorder), single episode, severe , no psychosis [F32.2]      Substance induced mood disorder [F19.94]        Problem Dx:   Mood disorder [F39]      Delusional disorder [F22]

## 2023-04-04 NOTE — BH SOCIAL WORK INITIAL PSYCHOSOCIAL EVALUATION - NSCMSPTSTRENGTHS_PSY_ALL_CORE
Able to adapt/Compliance to treatment/Expressive of emotions/Future/goal oriented/Physically healthy/Positive attitude/Strong support system

## 2023-04-04 NOTE — BH SOCIAL WORK INITIAL PSYCHOSOCIAL EVALUATION - OTHER PAST PSYCHIATRIC HISTORY (INCLUDE DETAILS REGARDING ONSET, COURSE OF ILLNESS, INPATIENT/OUTPATIENT TREATMENT)
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 Patient has a prior psychiatric history of bipolar disorder, and one prior psychiatric hospitalization 2 years ago at [facility] for paranoia. She is currently seeing a private psychiatrist Dr. Talbot for decades in the community. Patient admits to marijuana use and occasional alcohol use, suspected more again recently. Patient has no prior suicide attempts or self injurious behaviors. No known history of violence or aggression, however noted to have picked up a knife prior to admission which family states she had never done before. Patient was brought into the hospital by  for increasing paranoia towards family.

## 2023-04-05 PROCEDURE — 99231 SBSQ HOSP IP/OBS SF/LOW 25: CPT

## 2023-04-05 RX ADMIN — Medication 600 MILLIGRAM(S): at 12:15

## 2023-04-05 RX ADMIN — Medication 600 MILLIGRAM(S): at 17:52

## 2023-04-05 RX ADMIN — ARIPIPRAZOLE 10 MILLIGRAM(S): 15 TABLET ORAL at 08:26

## 2023-04-05 RX ADMIN — Medication 600 MILLIGRAM(S): at 11:20

## 2023-04-05 NOTE — BH INPATIENT PSYCHIATRY PROGRESS NOTE - OTHER
superficial currently denies si/hi intents or plans, minimizes previous possibly delusional content "okay"

## 2023-04-06 VITALS — TEMPERATURE: 98 F

## 2023-04-06 PROCEDURE — 99233 SBSQ HOSP IP/OBS HIGH 50: CPT

## 2023-04-06 RX ORDER — ARIPIPRAZOLE 15 MG/1
1 TABLET ORAL
Qty: 10 | Refills: 0
Start: 2023-04-06

## 2023-04-06 RX ADMIN — Medication 600 MILLIGRAM(S): at 08:08

## 2023-04-06 RX ADMIN — Medication 600 MILLIGRAM(S): at 01:50

## 2023-04-06 RX ADMIN — Medication 600 MILLIGRAM(S): at 08:45

## 2023-04-06 RX ADMIN — ARIPIPRAZOLE 10 MILLIGRAM(S): 15 TABLET ORAL at 08:08

## 2023-04-06 RX ADMIN — Medication 600 MILLIGRAM(S): at 14:10

## 2023-04-06 NOTE — BH INPATIENT PSYCHIATRY PROGRESS NOTE - NSTXANXGOAL_PSY_ALL_CORE
Report a reduction in panic attacks and improving mood and confidence
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
## 2023-04-06 NOTE — BH PSYCHOLOGY - GROUP THERAPY NOTE - NSBHPSYCHOLASSESSPROV_PSY_A_CORE
Licensed Psychologist
Licensed Psychologist and Psychology Trainee
Licensed Psychologist and Psychology Trainee

## 2023-04-06 NOTE — BH INPATIENT PSYCHIATRY DISCHARGE NOTE - HOSPITAL COURSE
to be done The patient is a 38 y/o female, , domiciles with  and 9 year old daughter, no significant PMHx, PPHx of ?BAD with one psych hospitalization (2 years ago at [facility] for paranoia), recent ED treat and release for similar symptoms; no past SA, no past violence/aggression, no legal history or arrests, history of trauma, history of NSSIB, recent cannabis use and increased alcohol use.  BIB by  after episode of suicidal ideation and endorsing paranoid delusions towards  after he spoke with patient's psychiatrist.   This was the second episode of paranoid content in the last few weeks and her previous hospitalization was also reportedly for paranoid content.  She reported she had a distant history of NSSIB and had been in DBT before [facility].  Differential included mood episode, substance induced mood episode, borderline personality given hisotry of NSSIB and DBT .     From the start she minimized presentaiton and denied si/hi itnents or plans.  She reported being "not entirely sure" about what happened in the episode. She was help seeking, however, and desired assistance and therapy referral.  She had a complicated history and presentation and it is difficult to partition the etiology of her presentation as described above.  In any event, she reported improved mood, decreased anxiety and denied si/hi intents or plans, avt hallucinations and delusions.  There was no helplessness, no  hopelessness, and she was future oriented.  THere was no evidnce of lorie.  She participated in groups and activities and did well on the unit.  She was not an acute danger to herself or others and was discharged to [facility] counseling with a 30 day supply of abilify 10mg daily at bedtime.

## 2023-04-06 NOTE — BH INPATIENT PSYCHIATRY PROGRESS NOTE - NSICDXBHPRIMARYDX_PSY_ALL_CORE
MDD (major depressive disorder), single episode, severe , no psychosis   F32.2  

## 2023-04-06 NOTE — BH INPATIENT PSYCHIATRY PROGRESS NOTE - NSBHMETABOLIC_PSY_ALL_CORE_FT
BMI:   HbA1c:   Glucose:   BP: 115/64 (04-05-23 @ 08:08) (115/64 - 115/64)  Lipid Panel: 
BMI:   HbA1c:   Glucose:   BP: 115/64 (04-05-23 @ 08:08) (115/64 - 115/64)  Lipid Panel: 
BMI:   HbA1c:   Glucose:   BP: 128/70 (04-02-23 @ 08:48) (128/70 - 158/76)  Lipid Panel:

## 2023-04-06 NOTE — BH INPATIENT PSYCHIATRY PROGRESS NOTE - PRN MEDS
MEDICATIONS  (PRN):  diphenhydrAMINE 50 milliGRAM(s) Oral every 8 hours PRN Rash and/or Itching  haloperidol     Tablet 5 milliGRAM(s) Oral every 8 hours PRN psychosis  haloperidol    Injectable 5 milliGRAM(s) IntraMuscular every 8 hours PRN Agitation  ibuprofen  Tablet. 600 milliGRAM(s) Oral every 6 hours PRN Mild Pain (1 - 3), Moderate Pain (4 - 6)  LORazepam     Tablet 2 milliGRAM(s) Oral every 8 hours PRN Agitation  LORazepam   Injectable 2 milliGRAM(s) IntraMuscular every 8 hours PRN Agitation  traZODone 50 milliGRAM(s) Oral Once PRN insomnia  

## 2023-04-06 NOTE — BH DISCHARGE NOTE NURSING/SOCIAL WORK/PSYCH REHAB - NSDCPRRECOMMEND_PSY_ALL_CORE
Psychiatric Rehabilitation staff recommends that the patient engage in outpatient services for continued medication and symptom management. Pt encouraged to engage in outpatient treatment services for medication management, support, and psychotherapy services.

## 2023-04-06 NOTE — BH INPATIENT PSYCHIATRY PROGRESS NOTE - NSBHFUPINTERVALCCFT_PSY_A_CORE
Patient seen, chart reviewed, case discussed with team.  Patient seen for suicidal ideation and psychosis.
Patient seen, chart reviewed, case discussed with team.  Patient seen for suicidal ideation and psychosis.
Patient seen, chart reviewed, case discussed with team.  Patient seen for discharge day management of suicidal ideation and psychosis.

## 2023-04-06 NOTE — BH PSYCHOLOGY - GROUP THERAPY NOTE - NSBHPSYCHOLPARTICIPCOMMENT_PSY_A_CORE FT
Patient appeared interested and attentive to the group discussion. Patient was observed following along with the provided worksheet and volunteered to read out loud when prompted. Patient briefly left the group before returning to her seat.
Patient appeared attentive and interested in the group discussion.  Although the patient did not contribute spontaneously during the group session, patient was able to read from the worksheet when prompted. Patient was able to engage with the  and other members appropriately.
Patient appeared interested and attentive to the group discussion. Patient volunteered to read out loud and frequently contributed to the group discussion. When discussing realistic self-talk patient said, “I really like to see the good in other people.” Additionally, patient was observed offering verbal support to fellow group members.

## 2023-04-06 NOTE — BH PSYCHOLOGY - GROUP THERAPY NOTE - NSPSYCHOLGRPGENGOAL_PSY_A_CORE
assessment/decrease symptoms/improve level of independent functioning/improve social/vocational/coping skills/psychoeducation/treatment compliance
assessment/decrease symptoms/improve level of independent functioning/improve social/vocational/coping skills/prevent relapse/psychoeducation/treatment compliance
assessment/decrease symptoms/improve level of independent functioning/improve social/vocational/coping skills/prevent relapse/psychoeducation/treatment compliance

## 2023-04-06 NOTE — BH INPATIENT PSYCHIATRY DISCHARGE NOTE - OTHER PAST PSYCHIATRIC HISTORY (INCLUDE DETAILS REGARDING ONSET, COURSE OF ILLNESS, INPATIENT/OUTPATIENT TREATMENT)
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 Patient has a prior psychiatric history of bipolar disorder, and one prior psychiatric hospitalization 2 years ago at [facility] for paranoia. She is currently seeing a private psychiatrist Dr. Talbot for decades in the community. Patient admits to marijuana use and occasional alcohol use, suspected more again recently. Patient has no prior suicide attempts or self injurious behaviors. No known history of violence or aggression, however noted to have picked up a knife prior to admission which family states she had never done before. Patient was brought into the hospital by  for increasing paranoia towards family.

## 2023-04-06 NOTE — BH PSYCHOLOGY - GROUP THERAPY NOTE - NSPSYCHOLGRPGENPT_PSY_A_CORE FT
Patient attended the psychology cognitive-behavior therapy group. The discussion was focused on the topic of Values. Group expectations, guidelines, confidentiality, and limitations were reviewed. The group began with a description of values and how it differs from goals. Group members then learned about how to take the necessary steps towards honoring the values one has chosen that gives significance to one's life. The role of mindfulness and the decisions that assist a person to live in accordance with one's values were explained. Discussion stemmed from the group's focus on the connection between thoughts, feelings and behaviors. Group members demonstrated their understanding through active participation, discussion, and by asking clarifying questions. Members were also provided with a handout describing the concepts and strategies discussed during group.
Patient attended the psychology cognitive-behavior therapy group. The discussion was focused on the topic of behavioral activation. Group expectations, guidelines, confidentiality and its limitations were reviewed. The group began with a description of behavioral activation as a treatment technique to decrease avoidance and isolation. Group members then learned how to introduce activities gradually into daily schedules. Also discussed were methods to create a behavioral activation schedule to assist in improving mood and activity levels.  Group members demonstrated their understanding through active participation, discussion, and by asking clarifying questions. Discussion stemmed from the group's focus on the connection between thoughts, feelings and behaviors. Group members were provided with a handout describing the concepts and strategies discussed during group.
Patient attended the psychology cognitive-behavior therapy group. The discussion was focused on the topic of Rational Thinking.  Group expectations, guidelines, confidentiality, and limitations were reviewed.  The group began with a description of how negative self-talk after a situation or experience results in a negative emotional response. Negative effects of emotional distress were discussed. Group members were then introduced to strategies to address negative-self talk such as Thought Stopping, Reframing, and Realistic Self-Talk. Discussion stemmed from the group's focus on the connection between thoughts, feelings and behaviors.  Group members demonstrated their understanding through active participation, discussion, and by asking clarifying questions.  Members were also provided with a handout describing the concepts and strategies discussed during group.

## 2023-04-06 NOTE — BH INPATIENT PSYCHIATRY DISCHARGE NOTE - NSDCCPCAREPLAN_GEN_ALL_CORE_FT
PT SCHEDULED AN APPT FOR 07-12-22.   PRINCIPAL DISCHARGE DIAGNOSIS  Diagnosis: Borderline personality disorder  Assessment and Plan of Treatment:      PRINCIPAL DISCHARGE DIAGNOSIS  Diagnosis: Mood disorder  Assessment and Plan of Treatment:

## 2023-04-06 NOTE — BH DISCHARGE NOTE NURSING/SOCIAL WORK/PSYCH REHAB - NSDCPRGOAL_PSY_ALL_CORE
Writer unable to meet with the pt to discuss the pt's progress throughout the inpatient stay as the pt was engaged meaningfully in psychology group at the time. However, during the current stay, the pt was able to engage in the safety planning process where she was able to readily identify coping skills, triggers, social support, and reasons for living.  Upon admission, patient presented in the context of paranoid thinking and suicidal ideation in relation to current stressors and substance intake. The pt reported upon admission that she was able to recognize that engaging in substance use is a warning sign for her and although her use is infrequent and social, the pt reported not wishing to continue to engage in use of any kind, especially when stressors are high. Since admission, the pt presented with good insight and judgement towards self and symptoms and was present and receptive to treatment and medication regiment. During inpatient stay, the patient was visible and social on the unit, participated in about 75% of offered groups, and is observed as bright, future oriented, and pleasant upon discharge. The patient is visible on the unit leading up to her discharge today. Pt was able to make progress towards specified treatment goals. Per chart, the patient denies SI/HI/I/P and AH/VH/TH and is able to contract for safety.

## 2023-04-06 NOTE — BH INPATIENT PSYCHIATRY PROGRESS NOTE - NSICDXBHTERTIARYDX_PSY_ALL_CORE
R/O Substance induced mood disorder   F19.94  R/O Borderline personality disorder   F60.3  

## 2023-04-06 NOTE — BH INPATIENT PSYCHIATRY PROGRESS NOTE - NSBHASSESSSUMMFT_PSY_ALL_CORE
The patient is a 38 y/o female, , domiciles with  and 9 year old daughter, no significant PMHx, PPHx of ?BAD with one psych hospitalization (2 years ago at [facility] for paranoia), recent ED treat and release for similar symptoms; no past SA, no past violence/aggression, no legal history or arrests, history of traums, history of NSSIB, recent cannabis use and increased alcohol use.  BIB by  after episode of suicidal ideation and endorsing paranoid delusions towards  after he spoke with patient's psychiatrist.   Differential included mood episode, substance induced mood episode, borderline personality.  Her acute presenting symptoms improved greatly and she was not an acute danger to herself or others and was discharged home with a 30 day supply of abilify 10mg at bedtime and outpatient follow up.
The patient is a 38 y/o female, , domiciles with  and 9 year old daughter, no significant PMHx, PPHx of ?BAD with one psych hospitalization (2 years ago at [facility] for paranoia), recent ED treat and release for similar symptoms; no past SA, no past violence/aggression, no legal history or arrests, history of traums, history of NSSIB, recent cannabis use and increased alcohol use.  BIB by  after episode of suicidal ideation and endorsing paranoid delusions towards  after he spoke with patient's psychiatrist.  She is rapidly improving, or at least appears to be.  Differential includes mood episode, substance induced mood episode, borderline personality.    1. Continue inpatient hospitalization for safety and stabilization.  Dangerous to self.  Seems to be doing beter, denies si/hi intents or plans.  Minimizing.  Routine checks appropriate.  2. haldol ativan and benadyrl prns  3. will increas abilify to 10mg daily.  4. will repeat ekg in am (prolonged QTC--likely secondary to acute etoh)
The patient is a 36 y/o female, , domiciles with  and 9 year old daughter, no significant PMHx, PPHx of ?BAD with one psych hospitalization (2 years ago at [facility] for paranoia), recent ED treat and release for similar symptoms; no past SA, no past violence/aggression, no legal history or arrests, history of traums, history of NSSIB, recent cannabis use and increased alcohol use.  BIB by  after episode of suicidal ideation and endorsing paranoid delusions towards  after he spoke with patient's psychiatrist.  She is rapidly improving, or at least appears to be.  Differential includes mood episode, substance induced mood episode, borderline personality.    1. Continue inpatient hospitalization for safety and stabilization.  Dangerous to self.  Seems to be doing beter, denies si/hi intents or plans.  Minimizing.  Routine checks appropriate.  2. haldol ativan and benadyrl prns  3. will increas abilify to 10mg daily.  4. will repeat ekg in am (prolonged QTC--likely secondary to acute etoh)

## 2023-04-06 NOTE — BH PSYCHOLOGY - GROUP THERAPY NOTE - NSBHPSYCHOLRESPONSE_PSY_A_CORE
Symptoms reduced/Coping skills acquired/Accepted support
Symptoms reduced/Coping skills acquired/Insight displayed/Accepted support
Symptoms reduced/Coping skills acquired/Insight displayed/Accepted support

## 2023-04-06 NOTE — BH INPATIENT PSYCHIATRY PROGRESS NOTE - NSBHCHARTREVIEWVS_PSY_A_CORE FT
Vital Signs Last 24 Hrs  T(C): 36.9 (04-03-23 @ 18:00), Max: 36.9 (04-03-23 @ 18:00)  T(F): 98.5 (04-03-23 @ 18:00), Max: 98.5 (04-03-23 @ 18:00)  HR: --  BP: --  BP(mean): --  RR: --  SpO2: --    Orthostatic VS  04-03-23 @ 18:00  Lying BP: --/-- HR: --  Sitting BP: 121/64 HR: 80  Standing BP: 118/73 HR: 83  Site: --  Mode: --  Orthostatic VS  04-03-23 @ 07:57  Lying BP: --/-- HR: --  Sitting BP: 131/74 HR: 75  Standing BP: --/-- HR: --  Site: --  Mode: --  
Vital Signs Last 24 Hrs  T(C): 36.6 (04-05-23 @ 08:08), Max: 36.6 (04-05-23 @ 08:08)  T(F): 97.8 (04-05-23 @ 08:08), Max: 97.8 (04-05-23 @ 08:08)  HR: 82 (04-05-23 @ 08:08) (82 - 82)  BP: 115/64 (04-05-23 @ 08:08) (115/64 - 115/64)  BP(mean): --  RR: --  SpO2: --    Orthostatic VS  04-03-23 @ 18:00  Lying BP: --/-- HR: --  Sitting BP: 121/64 HR: 80  Standing BP: 118/73 HR: 83  Site: --  Mode: --  
Vital Signs Last 24 Hrs  T(C): 36.7 (04-06-23 @ 06:47), Max: 36.7 (04-06-23 @ 06:47)  T(F): 98 (04-06-23 @ 06:47), Max: 98 (04-06-23 @ 06:47)  HR: --  BP: --  BP(mean): --  RR: --  SpO2: --    Orthostatic VS  04-06-23 @ 06:47  Lying BP: --/-- HR: --  Sitting BP: 125/79 HR: 77  Standing BP: --/-- HR: --  Site: upper left arm  Mode: electronic

## 2023-04-06 NOTE — BH DISCHARGE NOTE NURSING/SOCIAL WORK/PSYCH REHAB - DISCHARGE INSTRUCTIONS AFTERCARE APPOINTMENTS
In order to check the location, date, or time of your aftercare appointment, please refer to your Discharge Instructions Document given to you upon leaving the hospital.  If you have lost the instructions please call [phone]
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
## 2023-04-06 NOTE — BH INPATIENT PSYCHIATRY PROGRESS NOTE - NSBHFUPINTERVALHXFT_PSY_A_CORE
Staff report no interval events, adherent with medication, ibuprofen prns.  She continues to report fair mood, denies si/hi itnents or plans, reports reduced anxiety, better sleep.  Continues to report episode in terms of relationship problems and not in a frankly paranoid way.
Staff report no interval events, adherent with medication, ibuprofen prns.  She minimizes reports of delusional content and SI.  She relates she does have her troubles as does her partner.
Staff report no interval events, adherent with medication, ibuprofen prns.  Attends groups and activities, in good control, fair sleep.  She denies anxiety, depressed mood, helplessness, hopelessness, si/hi intents or plans.  Her acute presenting symptoms are greatly improved and she is not an acute danger to herself or others and is being discharged home with outpatient follow up.

## 2023-04-06 NOTE — BH INPATIENT PSYCHIATRY PROGRESS NOTE - NSBHATTESTBILLING_PSY_A_CORE
89457-Arfbgatnmq OBS or IP - high complexity OR 50-79 mins
99221-Initial OBS or IP - low complexity OR 40-54 mins
93333-Sgairdsihn OBS or IP - low complexity OR 25-34 mins

## 2023-04-06 NOTE — BH DISCHARGE NOTE NURSING/SOCIAL WORK/PSYCH REHAB - PATIENT PORTAL LINK FT
You can access the FollowMyHealth Patient Portal offered by [facility] by registering at the following website: [email or web address]. By joining [employer]’s FollowMyHealth portal, you will also be able to view your health information using other applications (apps) compatible with our system.

## 2023-05-05 NOTE — SOCIAL WORK POST DISCHARGE FOLLOW UP NOTE - NSBHSWFOLLOWUP_PSY_ALL_CORE_FT
SW has made multiple attempts to contact patient and her spouse regarding patient's missed appointments. No response. At the time of discharge treatment team did not feel that patient was a threat to self and others, and she was given alternate referral sources information as she expressed an interest in continuing to look for a provider outside of referral source. SW has exhausted outreach options. Case may be closed.

## 2023-05-11 NOTE — ED BEHAVIORAL HEALTH ASSESSMENT NOTE - NSTXRELFACTOR_PSY_ALL_CORE
[facility]   Physical Therapy Treatment Note     Name: Billy Holbrook  [facility] Number: [id number]    Therapy Diagnosis:   Encounter Diagnoses   Name Primary?    Acute pain of left knee Yes    Decreased range of motion of left knee     Quadriceps weakness     Difficulty walking      Physician: PEACE Dempsey MD    Visit Date: 5/11/2023    Physician Orders: PT Eval and Treat   Medical Diagnosis from Referral: M25.562 (ICD-10-CM) - Acute pain of left knee  Evaluation Date: 9/7/2022  Authorization Period Expiration: 3/12/2024  Plan of Care Expiration: 12/31/2023  Visit # / Visits authorized: 144 total 39/40     Precautions: Standard     Time In: 11:00 am  Time Out: 12:30  Total Billable Time:  90 minutes    Procedure: 9/20/2022  1. Left Knee Arthroscopy, anterior cruciate ligament reconstruction 92832  2.  Left Knee Arthroscopy, with meniscectomy (medial OR lateral) 09467  3.  Left Knee Arthroscopy, debridement/shaving of articular cartilage (chondroplasty) 60806  4.  Left Knee  platelet rich plasma injection to the bone-patellar tendon-bone harvest site    Post-Op Plan:  ACL reconstruction with a bone-patellar tendon-bone autograft    SUBJECTIVE     Pt reports: he is feeling good. Notes difficulty with deeper knee angles and strength on L v R.     He was compliant with home exercise program.  Response to previous treatment: no issues; mild soreness  Functional change: jumping is improved    Pain: 0/10   Location: left knee     OBJECTIVE     7 Months 22 days post op    Wt. 320 lbs    Observation:   no effusion noted L knee with sweep test  Well healed scar  Atrophy in calf/quad on L    No TTP L HS     Range of Motion (extension/neutral/flexion):     Right Left   Knee ROM: 8-0-125/130 deg P:8-0-130 deg  A:8-0-125 deg      Strength: (Biodex machine)    Right Left Comment   Knee Extension: 342.1 lbs (today) 266.6 lbs    22.1% deficit    Knee Flexion: 192.8 lbs  (Last test) 191.8 lbs     .05%  deficit    Hip Abduction: 5/5 4+/5       SL hop distance: approx 80% LSI  SL vertical: R= 12 in; L= 9.5 in  DL vertical 22 in    Mid quad girth: 18 cm proximal Patella  - R= 77 cm  - L= 74.5 cm    Treatment     Billy received the treatments listed below:      therapeutic exercises to develop strength, endurance, ROM, flexibility, posture, and core stabilization for 45 minutes including:  Education/assessment  Bike x 12 min lv 6->10  Heel prop x 5 min at start / end 10 lbs above/below  Knee extension isometric 8 x 30 sec at 60 deg-NP  Manual stretching linus LE and lumbar spine  SL leg press 4 x 5 linus R= 400 lbs: L= 340-360 lbs with good control/depth-NP  Biodex testing    manual therapy techniques: Joint mobilizations and Soft tissue Mobilization were applied for 10 minutes, including:  PFJ gr 3-4 glides all planes  Tibial AP glide gr 3-4 with and without rotation bias on and off heel prop  Knee hyperextension gr 3-4  Fat pad mobility    neuromuscular re-education activities to improve: Coordination, Kinesthetic, Sense, and Proprioception for 10 minutes. The following activities were included:  Knee hyperextensions x 20 x 3 sec hold  SL sit to stands from 24->20 inch box x 30 linus    Therapeutic activities to improve functional performance for 25 minutes, including:  SKTC, quad pull, HS scoop, HS march, high knee march, jogging butt kicks, jogging high knees, lunge, lateral lunge, band walks, monster walks, carioca > carioca w/ high knees, butt kicks, A skips, B skips, jogging x 4 laps  Lateral sled pull to sprint x 5 reps linus  Safety bar lunge to 2 in box + foam pad 5 x 5 linus 120->190 lbs    Patient Education and Home Exercises     Home Exercises Provided and Patient Education Provided     Education provided:   - HEP update, precautions, activity pacing, goals, timeframes    Written Home Exercises Provided: yes. Exercises were reviewed and Billy was able to demonstrate them prior to the end of the session.  Billy  demonstrated good  understanding of the education provided. See EMR under Patient Instructions for exercises provided during therapy sessions.     ASSESSMENT     Billy did well today. His Biodex shows a higher deficit today as his R LE extension tested higher than pre-op significantly. His L LE is stronger in both extension and flexion. The measures above are his best numbers compared bilaterally. Progressing control of higher loads in more depth of flexion on both LE's. Was scheduled to do performance training today, but strength  had to cancel. Pt reported/demonstrated no adverse response or exacerbation of symptoms during today's session.      Billy Is progressing well towards his goals.   Pt prognosis is Good.     Pt will continue to benefit from skilled outpatient physical therapy to address the deficits listed in the problem list box on initial evaluation, provide pt/family education and to maximize pt's level of independence in the home and community environment.     Pt's spiritual, cultural and educational needs considered and pt agreeable to plan of care and goals.     Anticipated barriers to physical therapy: none    Goals:  Short Term Goals: 8-10 weeks   1. Pt will be compliant with HEP 50% of prescribed amount. MET  2. The pt to demo improvement in left knee ROM to equal right knee ROM pain free MET  3.  The pt to demo good quad set with proper hyperextension moment of the Left knee MET  4. The pt to demo ambualting with least restricitve AD without major compensatory pattern left knee for at least 20 feet MET     Long Term Goals: 56 weeks (progressing, not met)   Pt will be compliant with % of prescribed amount.   The pt to demo pain free and uncompensated running mechanics x10 min on an indoor treadmill MET  The pt to demo tolerance to a squat at or bellow parallel with uncompensated mechanics x10 MET  The pt to demo strength of L LE within 10% of R LE as demo'd on the biodex machine    The pt to demo a deficit of 10% or less on a triple hop, single leg broad jump and crossover hop compared to non operative LE.   The pt will report full participation in ADLs and IADLs without restrictions related to L  knee.     PLAN     Continue formal PT 3x/wk and performance training 2x/week starting next week.     Biodex in 4 wks.     Manjit Lazcano, PT, DPT                                                 Non-compliant or not receiving treatment

## 2023-05-24 ENCOUNTER — OUTPATIENT (OUTPATIENT)
Dept: OUTPATIENT SERVICES | Facility: HOSPITAL | Age: 38
LOS: 1 days | Discharge: HOME | End: 2023-05-24
Payer: COMMERCIAL

## 2023-05-24 PROCEDURE — 90839 PSYTX CRISIS INITIAL 60 MIN: CPT | Mod: 95

## 2023-05-24 PROCEDURE — 99215 OFFICE O/P EST HI 40 MIN: CPT | Mod: 95

## 2023-05-30 ENCOUNTER — OUTPATIENT (OUTPATIENT)
Dept: OUTPATIENT SERVICES | Facility: HOSPITAL | Age: 38
LOS: 1 days | Discharge: HOME | End: 2023-05-30

## 2023-05-30 DIAGNOSIS — F12.20 CANNABIS DEPENDENCE, UNCOMPLICATED: ICD-10-CM

## 2023-05-31 DIAGNOSIS — F41.1 GENERALIZED ANXIETY DISORDER: ICD-10-CM

## 2023-05-31 DIAGNOSIS — F10.10 ALCOHOL ABUSE, UNCOMPLICATED: ICD-10-CM

## 2023-05-31 DIAGNOSIS — F33.1 MAJOR DEPRESSIVE DISORDER, RECURRENT, MODERATE: ICD-10-CM

## 2023-06-21 PROBLEM — Z00.00 ENCOUNTER FOR PREVENTIVE HEALTH EXAMINATION: Status: ACTIVE | Noted: 2023-06-21

## 2023-07-07 ENCOUNTER — APPOINTMENT (OUTPATIENT)
Dept: NEUROSURGERY | Facility: CLINIC | Age: 38
End: 2023-07-07
Payer: COMMERCIAL

## 2023-07-28 ENCOUNTER — TRANSCRIPTION ENCOUNTER (OUTPATIENT)
Age: 38
End: 2023-07-28

## 2023-07-28 ENCOUNTER — APPOINTMENT (OUTPATIENT)
Dept: NEUROSURGERY | Facility: CLINIC | Age: 38
End: 2023-07-28
Payer: COMMERCIAL

## 2023-07-28 VITALS
WEIGHT: 165 LBS | BODY MASS INDEX: 27.49 KG/M2 | HEART RATE: 81 BPM | OXYGEN SATURATION: 98 % | HEIGHT: 65 IN | DIASTOLIC BLOOD PRESSURE: 84 MMHG | SYSTOLIC BLOOD PRESSURE: 126 MMHG

## 2023-07-28 DIAGNOSIS — Z86.59 PERSONAL HISTORY OF OTHER MENTAL AND BEHAVIORAL DISORDERS: ICD-10-CM

## 2023-07-28 PROCEDURE — 99205 OFFICE O/P NEW HI 60 MIN: CPT

## 2023-07-28 RX ORDER — GABAPENTIN 300 MG/1
300 CAPSULE ORAL
Refills: 0 | Status: ACTIVE | COMMUNITY

## 2023-08-01 PROBLEM — Z86.59 HISTORY OF DEPRESSION: Status: RESOLVED | Noted: 2023-08-01 | Resolved: 2023-08-01

## 2023-08-01 RX ORDER — BUPROPION HYDROCHLORIDE 300 MG/1
300 TABLET, EXTENDED RELEASE ORAL
Refills: 0 | Status: ACTIVE | COMMUNITY

## 2023-08-01 RX ORDER — FLUOXETINE HYDROCHLORIDE 60 MG/1
TABLET ORAL
Refills: 0 | Status: ACTIVE | COMMUNITY

## 2023-08-01 NOTE — ASSESSMENT
[FreeTextEntry1] : IMPRESSION:  37-year-old female who presents for consultation regarding a known Chiari Malformation.  Reports that her last follow up imaging was in 2018. She does not have any symptoms.  PLAN: 1. MRI brain, cervical and thoracic spine. 2. FU after imaging.

## 2023-08-01 NOTE — HISTORY OF PRESENT ILLNESS
[de-identified] : Sadie Hernandez is a pleasant right-handed 37-year-old female who presents for consultation regarding a known Chiari Malformation.  Reports that her last follow up imaging was in 2018.  Pt states that she is under psychiatric care at [facility] and was advised to follow up with a neurosurgeon.  She denies weakness in the extremities, headaches, dropping items frequently.

## 2023-08-21 ENCOUNTER — APPOINTMENT (OUTPATIENT)
Dept: MRI IMAGING | Facility: CLINIC | Age: 38
End: 2023-08-21

## 2023-09-06 ENCOUNTER — APPOINTMENT (OUTPATIENT)
Dept: MRI IMAGING | Facility: CLINIC | Age: 38
End: 2023-09-06
Payer: COMMERCIAL

## 2023-09-06 PROCEDURE — 72141 MRI NECK SPINE W/O DYE: CPT

## 2023-09-06 PROCEDURE — 72146 MRI CHEST SPINE W/O DYE: CPT

## 2023-09-15 ENCOUNTER — NON-APPOINTMENT (OUTPATIENT)
Age: 38
End: 2023-09-15

## 2023-09-15 ENCOUNTER — APPOINTMENT (OUTPATIENT)
Dept: NEUROSURGERY | Facility: CLINIC | Age: 38
End: 2023-09-15
Payer: COMMERCIAL

## 2023-09-15 VITALS
OXYGEN SATURATION: 98 % | DIASTOLIC BLOOD PRESSURE: 81 MMHG | BODY MASS INDEX: 27.49 KG/M2 | HEART RATE: 89 BPM | SYSTOLIC BLOOD PRESSURE: 146 MMHG | HEIGHT: 65 IN | WEIGHT: 165 LBS

## 2023-09-15 DIAGNOSIS — G93.5 COMPRESSION OF BRAIN: ICD-10-CM

## 2023-09-15 DIAGNOSIS — Z78.9 OTHER SPECIFIED HEALTH STATUS: ICD-10-CM

## 2023-09-15 PROCEDURE — 99215 OFFICE O/P EST HI 40 MIN: CPT

## 2023-09-18 PROBLEM — G93.5 CHIARI I MALFORMATION: Status: ACTIVE | Noted: 2023-07-31

## 2023-09-18 PROBLEM — Z78.9 NON-SMOKER: Status: ACTIVE | Noted: 2023-08-01

## 2023-09-21 ENCOUNTER — NON-APPOINTMENT (OUTPATIENT)
Age: 38
End: 2023-09-21

## 2024-05-31 NOTE — ED ADULT NURSE REASSESSMENT NOTE - NS ED NURSE REASSESS COMMENT FT1
1100 pt calm & cooperative made aware of admission to .
Received report from night RN, pt calm & cooperative denies si/hi presently, safety & comfort measures maintained eval on going.
Pt resting comfortably in bed, vitals as charted. Respirations are even and unlabored. Eval ongoing
continue meds

## 2024-06-25 NOTE — ED BEHAVIORAL HEALTH ASSESSMENT NOTE - PERSONAL COLLATERAL NAME
Subjective:  Dipti Cutler is a 30 year old  at 33w1d who presents with pelvic pressure and leaking fluid. Pressure has been longstanding but has worsened. She has noticed intermittent leaking of clear fluid since at least Saturday. She si not wearing pads. Denies any vaginal irritation or odor. Did have intercourse last night. Denies any bleeding. Does report intermittent contractions. Patient reports regular fetal movement. Pt denies nausea and vomiting.    Her current pregnancy is significant for:  Obesity  H/o pre-eclampsia  cHTN - not on meds  Left Axillary Abscess - complication of Hidradenitis suppurativa   Noncompliance with pregnancy testing  H/o uterine inversion  asthma    Patient Active Problem List    Diagnosis Date Noted    Amniotic fluid leaking (CMD) 2024     Priority: Low    Pelvic pain affecting pregnancy in second trimester, antepartum (CMD) 2024     Priority: Low    Cellulitis 2024     Priority: Low    Supervision of high risk pregnancy, antepartum (CMD) 2024     Priority: Low     Platform    [X ] Viability ultrasound  [X ] Prenatal labs  [ ] Flu vaccine- DECLINES   [X ] Genetic screening - NIPS negative  [ ] AFP in 2nd trimester if NIPS performed  [X ] Anatomy US  [ ] Discuss prenatal classes/choosing peds  [ ] 28 wk labs  [ ] Rhogam if indicated  [ ] Tdap  [ ] Discuss breastfeeding/breast pump  [ ] GBS  [ ] Confirm presentation        Chronic hypertension in pregnancy (CMD) 2024     Priority: Low     [X ] Start baby aspirin at 12 weeks  [X ] Baseline HELLP labs (CBC, CMP, LDH)  [X ] Baseline urine protein/Cr ratio  [ ] ECG if severe HTN for > 4 years  [ ] Goal BP < 160/110  [ ] BP cuff at home  [ ] Growth US q 3 weeks starting at 28 weeks  [ ] Chronic hypertension not on meds: Weekly BPPs starting at 32 weeks  [ ] Chronic hypertension on meds: Weekly BPPs starting at 32 weeks (increase in frequency if having to increase medications)  Delivery timing:    [  ]  Not on medications - 38-39 weeks (should not go past 39w0d unless special circumstance per new ACOG PB)    [ ] Controlled on medications - 37-39 weeks    [ ] Difficult to control on medications (multiple dosage adjustments) - 36-37 weeks        History of pre-eclampsia in prior pregnancy, currently pregnant (CMD) 2024     Priority: Low     [X ] Start baby aspirin at 12 weeks  [X ] Baseline HELLP labs (CBC, CMP, LDH)  [X ] Baseline urine protein/Cr ratio  [ ] Growth US at 32 weeks        Obesity in pregnancy, antepartum (CMD) 2024     Priority: Low     Early DMS normal declines any further repeat with pregnancy     [X ] Consider starting baby aspirin if more than 1 risk factor for preeclampsia  [X ] Discuss optimal weight gain of 10-20 lbs in pregnancy  [ ] Growth US at 37 weeks   [ ] BPP weekly starting at 37 weeks   [ ] Consider IOL at 39 weeks, especially if other risk factors         History of abnormal cervical Pap smear 2024     Priority: Low     23: LSIL, positive HPV  23: Colpo- CIN1- recommend repeat pap smear in 1 year       History of uterine inversion- G1 2024     Priority: Low    Asthma with acute exacerbation, unspecified asthma severity, unspecified whether persistent (CMD) 2023     Priority: Low    History of elevated liver enzymes 2021     Priority: Low    Exacerbation of asthma (CMD) 2020     Priority: Low       OB History    Para Term  AB Living   4 3 2 1 0 3   SAB IAB Ectopic Molar Multiple Live Births   0 0 0 0 0 3      # Outcome Date GA Lbr Jabier/2nd Weight Sex Delivery Anes PTL Lv   4 Current            3  21 36w2d  2390 g F Vag-Spont EPI N ARTEMIO   2 Term 10/06/16 37w3d 02:14 / 00:15 2741 g M Vag-Spont EPI N ARTEMIO   1 Term 10/12/14 40w2d 12:02 / 02:20 3014 g F Vag-Spont EPI N ARTEMIO      Birth Comments: Inverted uterus, retained placenta       No current facility-administered medications for this encounter.        ALLERGIES:  Fish       Pertinent items are noted in HPI.    Objective:  Temp:  [98.7 °F (37.1 °C)] 98.7 °F (37.1 °C)  Heart Rate:  [84-95] 84  Resp:  [16] 16  BP: (111)/(71) 111/71    Physical Exam:  General: AAO x 3 appears stated age well developed, well nourished normal mood and affect no apparent distress  Head: normocephalic without obvious abnormality atraumatic   Abdomen: gravid soft  non tender no guarding  Pelvic: normal vagina and vulva, normal cervix without lesions, fluid pooling no, moderate amount of creamy white discharge  Uterine size:size equals dates  Extremities: non-tender LE edema none  Skin: skin color, texture, turgor normal, no rashes or lesions warm, dry, intact    Presentation:unsure    Cervix:     Dilation: 0   Effacement:0   Station: -4   Consistency: Firm   Position: Posterior    Fetal Heart Rate/ Movement:   Baseline:  130  Variability:  moderate  Periodic changes: +accels, no decels  Interpretation: Category: 1    Uterine Activity/ Exam:  Mode: [place]  Contraction frequency (min): rare, 1 in 20     Labs:   Recent Results (from the past 24 hour(s))   Urinalysis & Reflex Microscopy With Culture If Indicated    Collection Time: 06/25/24 11:12 AM   Result Value Ref Range    COLOR, URINALYSIS Straw     APPEARANCE, URINALYSIS Cloudy     GLUCOSE, URINALYSIS Negative Negative mg/dL    BILIRUBIN, URINALYSIS Negative Negative    KETONES, URINALYSIS Negative Negative mg/dL    SPECIFIC GRAVITY, URINALYSIS 1.012 1.005 - 1.030    OCCULT BLOOD, URINALYSIS Negative Negative    PH, URINALYSIS 7.0 5.0 - 7.0    PROTEIN, URINALYSIS Trace (A) Negative mg/dL    UROBILINOGEN, URINALYSIS 0.2 0.2, 1.0 mg/dL    NITRITE, URINALYSIS Negative Negative    LEUKOCYTE ESTERASE, URINALYSIS Negative Negative   POCT Fern Testing    Collection Time: 06/25/24 11:17 AM   Result Value Ref Range    Fern Test POC Ferning Not Present    POCT Nitrazine pH    Collection Time: 06/25/24 11:17 AM   Result Value Ref Range     Nitrazine pH POC Negative    POCT WET MOUNT SALINE, VAGINAL    Collection Time: 24 11:17 AM   Result Value Ref Range    Clue Cells POC None Seen     Yeast POC Present     Trichomonas POC None Seen      GBS:   CULTURE   Date Value Ref Range Status   2019 NO BETA HEMOLYTIC STREPTOCOCCI ISOLATED  Final       Imaging/Diagnostics:  No results found.    Assessment: Dipti Cutler is a 30 year old  at 33w1d who presents for pelvic pressure, r/o PPROM.  Exam negative for rupture  Suspect musculoskeletal discomfort of pregnancy  Obesity  H/o pre-eclampsia  cHTN - not on meds  Left Axillary Abscess - complication of Hidradenitis suppurativa   Noncompliance with pregnancy testing  H/o uterine inversion  Asthma  Reactive NST      Plan:   UA - negative  Wet prep - positive for yeast  SSE negative for rupture  No sign of labor - rare contractions on monitor, cervix closed.     Will discharge home with precautions, and instructions to return with worsening or persistent symptoms.   Danger signs/symptoms, comfort measures and fetal movement counting reviewed prior to discharge home. Patient informed to keep appointment with provider as scheduled.  Home ambulatory.       Martha Hussein DO         [place]

## 2024-12-16 ENCOUNTER — APPOINTMENT (OUTPATIENT)
Dept: ENDOCRINOLOGY | Facility: CLINIC | Age: 39
End: 2024-12-16

## 2025-03-31 ENCOUNTER — NON-APPOINTMENT (OUTPATIENT)
Age: 40
End: 2025-03-31

## 2025-04-21 NOTE — ED PROVIDER NOTE - CONTACT TIME
Answers submitted by the patient for this visit:  Back Pain Questionnaire (Submitted on 4/19/2025)  Chief Complaint: Back pain  Chronicity: recurrent  Onset: more than 1 year ago  Frequency: intermittently  Progression since onset: unchanged  Pain location: lumbar spine  Pain quality: shooting  Radiates to: does not radiate  Pain - numeric: 7/10  Pain is: the same all the time  Aggravated by: bending, coughing, position, sitting, standing, twisting  Stiffness is present: in the morning  abdominal pain: No  bladder incontinence: No  bowel incontinence: No  chest pain: No  dysuria: No  fever: No  leg pain: No  numbness: No  paresthesias: No  pelvic pain: No  perianal numbness: No  tingling: No  weakness: No  weight loss: No  Risk factors: menopause, obesity  Chief Complaint  Back Pain (3-4 weeks with rad to right side, down leg a little)    Vanessa Vidal presents to [facility]  History of Present Illness  Back pain for 3-4 weeks. With radiation down right side. Down leg a little.  Has been taking tylenol and muscle relaxer.  Has been doing stretches at home and nothing has helped.   Back Pain  This is a recurrent problem. The current episode started more than 1 year ago. The problem occurs intermittently. The problem is unchanged. The pain is present in the lumbar spine. The quality of the pain is described as shooting. The pain does not radiate. The pain is at a severity of 7/10. The pain is The same all the time. The symptoms are aggravated by bending, coughing, position, sitting, standing and twisting. Stiffness is present In the morning. Pertinent negatives include no abdominal pain, bladder incontinence, bowel incontinence, chest pain, dysuria, fever, leg pain, numbness, paresthesias, pelvic pain, perianal numbness, tingling, weakness or weight loss. Risk factors include menopause and obesity.       The following portions of the patient's history were  personally reviewed and updated as appropriate: allergies, current medications, past medical history, past surgical history, past family history, and past social history.     Body mass index is 29.24 kg/m².    BMI is >= 25 and <30. (Overweight) The following options were offered after discussion;: weight loss educational material (shared in after visit summary)      Past History:    Medical History: has a past medical history of Allergy, Anemia, Arthritis, Bursitis of hip, Cervical disc disorder, Hemorrhoids, Lumbosacral disc disease, and Night sweats.     Surgical History: has a past surgical history that includes Breast Augmentation (2016); Tubal ligation (1997); Skin cancer excision; Colonoscopy (N/A, 02/02/2022); and Breast surgery (2005).     Family History: family history includes Breast cancer in her maternal grandmother; Cervical cancer in her maternal grandmother; Diabetes in her maternal grandfather.     Social History: reports that she quit smoking about 13 years ago. Her smoking use included cigarettes. She has a 20 pack-year smoking history. She has never used smokeless tobacco. She reports that she does not currently use alcohol after a past usage of about 1.0 standard drink of alcohol per week. She reports that she does not use drugs.    Allergies: Patient has no known allergies.          Current Outpatient Medications:     acyclovir (ZOVIRAX) 800 MG tablet, Take 1 tablet by mouth 2 (Two) Times a Day., Disp: , Rfl:     diclofenac (VOLTAREN) 75 MG EC tablet, TAKE 1 TABLET BY MOUTH DAILY, Disp: 90 tablet, Rfl: 1    metroNIDAZOLE (METROCREAM) 0.75 % cream, Apply 1 Application topically to the appropriate area as directed 2 (Two) Times a Day., Disp: , Rfl:     cyclobenzaprine (FLEXERIL) 5 MG tablet, Take 1 tablet by mouth 3 (Three) Times a Day As Needed for Muscle Spasms., Disp: 60 tablet, Rfl: 1    methylPREDNISolone (MEDROL) 4 MG dose pack, Take as directed on package instructions., Disp: 21 tablet,  "Rfl: 0    Medications Discontinued During This Encounter   Medication Reason    valACYclovir (Valtrex) 1000 MG tablet *Therapy completed    polyethylene glycol (MIRALAX) 17 g packet *Therapy completed    THYROID PO *Therapy completed    Unable to find *Therapy completed    ESTRADIOL ACETATE PO *Therapy completed    vitamin D3 125 MCG (5000 UT) capsule capsule *Therapy completed    DHEA 10 MG capsule *Therapy completed         Review of Systems   Constitutional:  Negative for fever and unexpected weight loss.   Cardiovascular:  Negative for chest pain.   Gastrointestinal:  Negative for abdominal pain and bowel incontinence.   Genitourinary:  Negative for dysuria, pelvic pain and urinary incontinence.   Musculoskeletal:  Positive for back pain.   Neurological:  Negative for tingling, weakness, numbness and paresthesias.        Objective         Vitals:    04/21/25 1253   BP: 100/60   BP Location: Right arm   Patient Position: Sitting   Cuff Size: Adult   Pulse: 88   Resp: 18   Temp: 98.2 °F (36.8 °C)   TempSrc: Temporal   SpO2: 97%   Weight: 74.8 kg (165 lb)   Height: 160 cm (62.99\")     Body mass index is 29.24 kg/m².         Physical Exam  Vitals reviewed.   Constitutional:       Appearance: Normal appearance. She is well-developed.   HENT:      Head: Normocephalic and atraumatic.      Mouth/Throat:      Pharynx: No oropharyngeal exudate.   Eyes:      Conjunctiva/sclera: Conjunctivae normal.      Pupils: Pupils are equal, round, and reactive to light.   Cardiovascular:      Rate and Rhythm: Normal rate and regular rhythm.      Heart sounds: Normal heart sounds. No murmur heard.     No friction rub. No gallop.   Pulmonary:      Effort: Pulmonary effort is normal.      Breath sounds: Normal breath sounds. No wheezing or rhonchi.   Musculoskeletal:        Arms:       Comments: Tenderness to palpation.   Skin:     General: Skin is warm and dry.   Neurological:      Mental Status: She is alert and oriented to person, " place, and time.   Psychiatric:         Mood and Affect: Mood and affect normal.         Behavior: Behavior normal.         Thought Content: Thought content normal.         Judgment: Judgment normal.             Result Review :               Assessment and Plan     Diagnoses and all orders for this visit:    1. 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 Acute right-sided low back pain with right-sided sciatica  -     XR Spine Lumbar 4+ View; Future  -     MRI Lumbar Spine Without Contrast; Future  -     cyclobenzaprine (FLEXERIL) 5 MG tablet; Take 1 tablet by mouth 3 (Three) Times a Day As Needed for Muscle Spasms.  Dispense: 60 tablet; Refill: 1  -     methylPREDNISolone (MEDROL) 4 MG dose pack; Take as directed on package instructions.  Dispense: 21 tablet; Refill: 0              Follow Up     Return for Next scheduled follow up.    Patient was given instructions and counseling regarding her condition or for health maintenance advice. Please see specific information pulled into the AVS if appropriate.          01-Apr-2023 10:33

## 2025-05-09 PROCEDURE — 90833 PSYTX W PT W E/M 30 MIN: CPT

## 2025-05-09 PROCEDURE — 99204 OFFICE O/P NEW MOD 45 MIN: CPT

## 2025-05-12 ENCOUNTER — APPOINTMENT (OUTPATIENT)
Dept: INTERNAL MEDICINE | Facility: CLINIC | Age: 40
End: 2025-05-12